# Patient Record
Sex: FEMALE | Race: WHITE | Employment: FULL TIME | URBAN - METROPOLITAN AREA
[De-identification: names, ages, dates, MRNs, and addresses within clinical notes are randomized per-mention and may not be internally consistent; named-entity substitution may affect disease eponyms.]

---

## 2020-04-22 PROBLEM — G44.229 CHRONIC TENSION-TYPE HEADACHE, NOT INTRACTABLE: Status: ACTIVE | Noted: 2019-01-23

## 2020-04-22 PROBLEM — K58.2 IRRITABLE BOWEL SYNDROME WITH BOTH CONSTIPATION AND DIARRHEA: Status: ACTIVE | Noted: 2018-11-15

## 2020-04-22 PROBLEM — Z34.90 PREGNANCY: Status: ACTIVE | Noted: 2020-04-22

## 2020-04-22 PROBLEM — I10 ESSENTIAL HYPERTENSION, BENIGN: Status: ACTIVE | Noted: 2019-01-11

## 2020-04-22 PROBLEM — O09.30 LATE PRENATAL CARE: Status: ACTIVE | Noted: 2020-04-22

## 2020-04-22 PROBLEM — F17.210 CIGARETTE SMOKER: Status: ACTIVE | Noted: 2019-01-03

## 2020-04-22 PROBLEM — I10 ESSENTIAL HYPERTENSION, BENIGN: Status: RESOLVED | Noted: 2019-01-11 | Resolved: 2020-04-22

## 2020-04-22 PROBLEM — E28.2 PCOS (POLYCYSTIC OVARIAN SYNDROME): Status: ACTIVE | Noted: 2020-04-22

## 2020-04-22 PROBLEM — C71.1: Status: ACTIVE | Noted: 2019-03-19

## 2020-04-30 PROBLEM — O34.80 POLYCYSTIC OVARY AFFECTING PREGNANCY, ANTEPARTUM: Status: ACTIVE | Noted: 2020-04-22

## 2020-04-30 PROBLEM — G44.229 CHRONIC TENSION-TYPE HEADACHE, NOT INTRACTABLE: Status: RESOLVED | Noted: 2019-01-23 | Resolved: 2020-04-30

## 2020-04-30 PROBLEM — O99.332 TOBACCO SMOKING AFFECTING PREGNANCY IN SECOND TRIMESTER: Status: ACTIVE | Noted: 2019-01-03

## 2020-04-30 PROBLEM — O09.92 HIGH-RISK PREGNANCY IN SECOND TRIMESTER: Status: ACTIVE | Noted: 2020-04-22

## 2020-05-04 PROBLEM — Z87.898 HISTORY OF BRAIN TUMOR: Status: ACTIVE | Noted: 2019-03-19

## 2020-05-04 PROBLEM — O99.613 IRRITABLE BOWEL SYNDROME AFFECTING PREGNANCY IN THIRD TRIMESTER: Status: ACTIVE | Noted: 2018-11-15

## 2020-05-04 PROBLEM — O99.332 TOBACCO SMOKING AFFECTING PREGNANCY IN SECOND TRIMESTER: Status: RESOLVED | Noted: 2019-01-03 | Resolved: 2020-05-04

## 2020-05-04 PROBLEM — O99.612 IRRITABLE BOWEL SYNDROME AFFECTING PREGNANCY IN SECOND TRIMESTER: Status: ACTIVE | Noted: 2018-11-15

## 2020-05-04 PROBLEM — K58.9 IRRITABLE BOWEL SYNDROME AFFECTING PREGNANCY IN SECOND TRIMESTER: Status: ACTIVE | Noted: 2018-11-15

## 2020-05-04 PROBLEM — C71.9 ASTROCYTOMA BRAIN TUMOR (HCC): Status: ACTIVE | Noted: 2019-03-19

## 2020-09-04 LAB — GRBS, EXTERNAL: NEGATIVE

## 2020-09-25 PROBLEM — Z23 ENCOUNTER FOR IMMUNIZATION: Status: ACTIVE | Noted: 2020-09-25

## 2020-10-02 ENCOUNTER — HOSPITAL ENCOUNTER (INPATIENT)
Age: 30
LOS: 2 days | Discharge: HOME | DRG: 540 | End: 2020-10-04
Attending: OBSTETRICS & GYNECOLOGY | Admitting: OBSTETRICS & GYNECOLOGY
Payer: MEDICAID

## 2020-10-02 ENCOUNTER — ANESTHESIA (OUTPATIENT)
Dept: LABOR AND DELIVERY | Age: 30
DRG: 540 | End: 2020-10-02
Payer: MEDICAID

## 2020-10-02 ENCOUNTER — ANESTHESIA EVENT (OUTPATIENT)
Dept: LABOR AND DELIVERY | Age: 30
DRG: 540 | End: 2020-10-02
Payer: MEDICAID

## 2020-10-02 DIAGNOSIS — O47.9 UTERINE CONTRACTIONS DURING PREGNANCY: Primary | ICD-10-CM

## 2020-10-02 LAB
ABO + RH BLD: NORMAL
ARTERIAL PATENCY WRIST A: ABNORMAL
ARTERIAL PATENCY WRIST A: ABNORMAL
BASE DEFICIT BLD-SCNC: 5 MMOL/L
BASE DEFICIT BLD-SCNC: 5 MMOL/L
BDY SITE: ABNORMAL
BDY SITE: ABNORMAL
BLOOD GROUP ANTIBODIES SERPL: NORMAL
CA-I BLD-MCNC: 1.38 MMOL/L (ref 1.12–1.32)
CA-I BLD-MCNC: 1.39 MMOL/L (ref 1.12–1.32)
COLLECT TIME,HTIME: 2036
COLLECT TIME,HTIME: 2036
ERYTHROCYTE [DISTWIDTH] IN BLOOD BY AUTOMATED COUNT: 13.5 % (ref 11.9–14.6)
GAS FLOW.O2 O2 DELIVERY SYS: ABNORMAL L/MIN
GAS FLOW.O2 O2 DELIVERY SYS: ABNORMAL L/MIN
GLUCOSE BLD STRIP.AUTO-MCNC: 90 MG/DL (ref 65–100)
GLUCOSE BLD STRIP.AUTO-MCNC: 90 MG/DL (ref 65–100)
HCO3 BLD-SCNC: 20.4 MMOL/L (ref 22–26)
HCO3 BLD-SCNC: 20.8 MMOL/L (ref 22–26)
HCT VFR BLD AUTO: 37.7 % (ref 35.8–46.3)
HGB BLD-MCNC: 13 G/DL (ref 11.7–15.4)
MCH RBC QN AUTO: 29.1 PG (ref 26.1–32.9)
MCHC RBC AUTO-ENTMCNC: 34.5 G/DL (ref 31.4–35)
MCV RBC AUTO: 84.3 FL (ref 79.6–97.8)
NRBC # BLD: 0 K/UL (ref 0–0.2)
PCO2 BLD: 38.4 MMHG (ref 35–45)
PCO2 BLD: 38.8 MMHG (ref 35–45)
PH BLD: 7.33 [PH] (ref 7.35–7.45)
PH BLD: 7.34 [PH] (ref 7.35–7.45)
PLATELET # BLD AUTO: 212 K/UL (ref 150–450)
PMV BLD AUTO: 10.2 FL (ref 9.4–12.3)
PO2 BLD: 26 MMHG (ref 75–100)
PO2 BLD: 27 MMHG (ref 75–100)
POTASSIUM BLD-SCNC: 5.3 MMOL/L (ref 3.5–5.1)
POTASSIUM BLD-SCNC: 5.8 MMOL/L (ref 3.5–5.1)
RBC # BLD AUTO: 4.47 M/UL (ref 4.05–5.2)
SAO2 % BLD: 44 % (ref 95–98)
SAO2 % BLD: 46 % (ref 95–98)
SERVICE CMNT-IMP: ABNORMAL
SERVICE CMNT-IMP: ABNORMAL
SODIUM BLD-SCNC: 137 MMOL/L (ref 136–145)
SODIUM BLD-SCNC: 139 MMOL/L (ref 136–145)
SPECIMEN EXP DATE BLD: NORMAL
SPECIMEN TYPE: ABNORMAL
SPECIMEN TYPE: ABNORMAL
WBC # BLD AUTO: 9.8 K/UL (ref 4.3–11.1)

## 2020-10-02 PROCEDURE — 2709999900 HC NON-CHARGEABLE SUPPLY: Performed by: OBSTETRICS & GYNECOLOGY

## 2020-10-02 PROCEDURE — 76060000078 HC EPIDURAL ANESTHESIA: Performed by: OBSTETRICS & GYNECOLOGY

## 2020-10-02 PROCEDURE — 74011250636 HC RX REV CODE- 250/636: Performed by: OBSTETRICS & GYNECOLOGY

## 2020-10-02 PROCEDURE — 65270000029 HC RM PRIVATE

## 2020-10-02 PROCEDURE — 77030002974 HC SUT PLN J&J -A: Performed by: OBSTETRICS & GYNECOLOGY

## 2020-10-02 PROCEDURE — 85027 COMPLETE CBC AUTOMATED: CPT

## 2020-10-02 PROCEDURE — 74011000250 HC RX REV CODE- 250: Performed by: ANESTHESIOLOGY

## 2020-10-02 PROCEDURE — 2709999900 HC NON-CHARGEABLE SUPPLY

## 2020-10-02 PROCEDURE — 75410000003 HC RECOV DEL/VAG/CSECN EA 0.5 HR: Performed by: OBSTETRICS & GYNECOLOGY

## 2020-10-02 PROCEDURE — 74011000250 HC RX REV CODE- 250: Performed by: REGISTERED NURSE

## 2020-10-02 PROCEDURE — 75410000002 HC LABOR FEE PER 1 HR

## 2020-10-02 PROCEDURE — 77030040922 HC BLNKT HYPOTHRM STRY -A: Performed by: ANESTHESIOLOGY

## 2020-10-02 PROCEDURE — 99285 EMERGENCY DEPT VISIT HI MDM: CPT

## 2020-10-02 PROCEDURE — 77030002888 HC SUT CHRMC J&J -A: Performed by: OBSTETRICS & GYNECOLOGY

## 2020-10-02 PROCEDURE — 77030032490 HC SLV COMPR SCD KNE COVD -B: Performed by: OBSTETRICS & GYNECOLOGY

## 2020-10-02 PROCEDURE — 77030019905 HC CATH URETH INTMIT MDII -A

## 2020-10-02 PROCEDURE — 77030002933 HC SUT MCRYL J&J -A: Performed by: OBSTETRICS & GYNECOLOGY

## 2020-10-02 PROCEDURE — 74011000250 HC RX REV CODE- 250: Performed by: OBSTETRICS & GYNECOLOGY

## 2020-10-02 PROCEDURE — 82947 ASSAY GLUCOSE BLOOD QUANT: CPT

## 2020-10-02 PROCEDURE — 77030034696 HC CATH URETH FOL 2W BARD -A: Performed by: OBSTETRICS & GYNECOLOGY

## 2020-10-02 PROCEDURE — 82803 BLOOD GASES ANY COMBINATION: CPT

## 2020-10-02 PROCEDURE — 74011250637 HC RX REV CODE- 250/637: Performed by: OBSTETRICS & GYNECOLOGY

## 2020-10-02 PROCEDURE — 75410000003 HC RECOV DEL/VAG/CSECN EA 0.5 HR

## 2020-10-02 PROCEDURE — 76010000392 HC C SECN EA ADDL 0.5 HR: Performed by: OBSTETRICS & GYNECOLOGY

## 2020-10-02 PROCEDURE — 74011250637 HC RX REV CODE- 250/637: Performed by: ANESTHESIOLOGY

## 2020-10-02 PROCEDURE — 76060000078 HC EPIDURAL ANESTHESIA

## 2020-10-02 PROCEDURE — 76010000391 HC C SECN FIRST 1 HR: Performed by: OBSTETRICS & GYNECOLOGY

## 2020-10-02 PROCEDURE — 77030014125 HC TY EPDRL BBMI -B: Performed by: ANESTHESIOLOGY

## 2020-10-02 PROCEDURE — 36415 COLL VENOUS BLD VENIPUNCTURE: CPT

## 2020-10-02 PROCEDURE — 86900 BLOOD TYPING SEROLOGIC ABO: CPT

## 2020-10-02 PROCEDURE — 4A1HX4Z MONITORING OF PRODUCTS OF CONCEPTION, CARDIAC ELECTRICAL ACTIVITY, EXTERNAL APPROACH: ICD-10-PCS | Performed by: OBSTETRICS & GYNECOLOGY

## 2020-10-02 PROCEDURE — 00HU33Z INSERTION OF INFUSION DEVICE INTO SPINAL CANAL, PERCUTANEOUS APPROACH: ICD-10-PCS | Performed by: ANESTHESIOLOGY

## 2020-10-02 PROCEDURE — 74011250636 HC RX REV CODE- 250/636: Performed by: REGISTERED NURSE

## 2020-10-02 PROCEDURE — 59025 FETAL NON-STRESS TEST: CPT

## 2020-10-02 PROCEDURE — A4300 CATH IMPL VASC ACCESS PORTAL: HCPCS | Performed by: ANESTHESIOLOGY

## 2020-10-02 RX ORDER — SODIUM CHLORIDE, SODIUM LACTATE, POTASSIUM CHLORIDE, CALCIUM CHLORIDE 600; 310; 30; 20 MG/100ML; MG/100ML; MG/100ML; MG/100ML
100 INJECTION, SOLUTION INTRAVENOUS CONTINUOUS
Status: DISCONTINUED | OUTPATIENT
Start: 2020-10-02 | End: 2020-10-02

## 2020-10-02 RX ORDER — ONDANSETRON 2 MG/ML
INJECTION INTRAMUSCULAR; INTRAVENOUS AS NEEDED
Status: DISCONTINUED | OUTPATIENT
Start: 2020-10-02 | End: 2020-10-02 | Stop reason: HOSPADM

## 2020-10-02 RX ORDER — KETOROLAC TROMETHAMINE 30 MG/ML
30 INJECTION, SOLUTION INTRAMUSCULAR; INTRAVENOUS
Status: DISCONTINUED | OUTPATIENT
Start: 2020-10-02 | End: 2020-10-02

## 2020-10-02 RX ORDER — ROPIVACAINE HYDROCHLORIDE 2 MG/ML
INJECTION, SOLUTION EPIDURAL; INFILTRATION; PERINEURAL
Status: DISCONTINUED | OUTPATIENT
Start: 2020-10-02 | End: 2020-10-02 | Stop reason: HOSPADM

## 2020-10-02 RX ORDER — OXYTOCIN/RINGER'S LACTATE 30/500 ML
1-25 PLASTIC BAG, INJECTION (ML) INTRAVENOUS
Status: DISCONTINUED | OUTPATIENT
Start: 2020-10-02 | End: 2020-10-02

## 2020-10-02 RX ORDER — SODIUM CHLORIDE 0.9 % (FLUSH) 0.9 %
5-40 SYRINGE (ML) INJECTION EVERY 8 HOURS
Status: DISCONTINUED | OUTPATIENT
Start: 2020-10-02 | End: 2020-10-02

## 2020-10-02 RX ORDER — IBUPROFEN 400 MG/1
400 TABLET ORAL
Status: DISCONTINUED | OUTPATIENT
Start: 2020-10-02 | End: 2020-10-02

## 2020-10-02 RX ORDER — LIDOCAINE HYDROCHLORIDE AND EPINEPHRINE 20; 5 MG/ML; UG/ML
INJECTION, SOLUTION EPIDURAL; INFILTRATION; INTRACAUDAL; PERINEURAL AS NEEDED
Status: DISCONTINUED | OUTPATIENT
Start: 2020-10-02 | End: 2020-10-02 | Stop reason: HOSPADM

## 2020-10-02 RX ORDER — MORPHINE SULFATE 10 MG/ML
5 INJECTION, SOLUTION INTRAMUSCULAR; INTRAVENOUS
Status: DISCONTINUED | OUTPATIENT
Start: 2020-10-02 | End: 2020-10-02

## 2020-10-02 RX ORDER — SIMETHICONE 80 MG
80 TABLET,CHEWABLE ORAL
Status: DISCONTINUED | OUTPATIENT
Start: 2020-10-03 | End: 2020-10-04 | Stop reason: HOSPADM

## 2020-10-02 RX ORDER — NALOXONE HYDROCHLORIDE 0.4 MG/ML
0.1 INJECTION, SOLUTION INTRAMUSCULAR; INTRAVENOUS; SUBCUTANEOUS
Status: DISCONTINUED | OUTPATIENT
Start: 2020-10-02 | End: 2020-10-03

## 2020-10-02 RX ORDER — OXYTOCIN/RINGER'S LACTATE 30/500 ML
PLASTIC BAG, INJECTION (ML) INTRAVENOUS
Status: DISCONTINUED | OUTPATIENT
Start: 2020-10-02 | End: 2020-10-02 | Stop reason: HOSPADM

## 2020-10-02 RX ORDER — SODIUM CHLORIDE 0.9 % (FLUSH) 0.9 %
5-40 SYRINGE (ML) INJECTION AS NEEDED
Status: DISCONTINUED | OUTPATIENT
Start: 2020-10-02 | End: 2020-10-03

## 2020-10-02 RX ORDER — KETOROLAC TROMETHAMINE 30 MG/ML
INJECTION, SOLUTION INTRAMUSCULAR; INTRAVENOUS AS NEEDED
Status: DISCONTINUED | OUTPATIENT
Start: 2020-10-02 | End: 2020-10-02 | Stop reason: HOSPADM

## 2020-10-02 RX ORDER — CEFAZOLIN SODIUM/WATER 2 G/20 ML
2 SYRINGE (ML) INTRAVENOUS
Status: COMPLETED | OUTPATIENT
Start: 2020-10-02 | End: 2020-10-02

## 2020-10-02 RX ORDER — SODIUM CHLORIDE 0.9 % (FLUSH) 0.9 %
5-40 SYRINGE (ML) INJECTION AS NEEDED
Status: DISCONTINUED | OUTPATIENT
Start: 2020-10-02 | End: 2020-10-02

## 2020-10-02 RX ORDER — SODIUM CHLORIDE, SODIUM LACTATE, POTASSIUM CHLORIDE, CALCIUM CHLORIDE 600; 310; 30; 20 MG/100ML; MG/100ML; MG/100ML; MG/100ML
75 INJECTION, SOLUTION INTRAVENOUS CONTINUOUS
Status: DISCONTINUED | OUTPATIENT
Start: 2020-10-02 | End: 2020-10-02

## 2020-10-02 RX ORDER — DEXTROSE, SODIUM CHLORIDE, SODIUM LACTATE, POTASSIUM CHLORIDE, AND CALCIUM CHLORIDE 5; .6; .31; .03; .02 G/100ML; G/100ML; G/100ML; G/100ML; G/100ML
125 INJECTION, SOLUTION INTRAVENOUS CONTINUOUS
Status: DISCONTINUED | OUTPATIENT
Start: 2020-10-02 | End: 2020-10-02

## 2020-10-02 RX ORDER — DIPHENHYDRAMINE HYDROCHLORIDE 50 MG/ML
12.5 INJECTION, SOLUTION INTRAMUSCULAR; INTRAVENOUS
Status: DISCONTINUED | OUTPATIENT
Start: 2020-10-02 | End: 2020-10-02 | Stop reason: SDUPTHER

## 2020-10-02 RX ORDER — OXYCODONE AND ACETAMINOPHEN 7.5; 325 MG/1; MG/1
2 TABLET ORAL
Status: DISCONTINUED | OUTPATIENT
Start: 2020-10-02 | End: 2020-10-02

## 2020-10-02 RX ORDER — LIDOCAINE HYDROCHLORIDE AND EPINEPHRINE 15; 5 MG/ML; UG/ML
INJECTION, SOLUTION EPIDURAL
Status: COMPLETED | OUTPATIENT
Start: 2020-10-02 | End: 2020-10-02

## 2020-10-02 RX ORDER — SODIUM CHLORIDE, SODIUM LACTATE, POTASSIUM CHLORIDE, CALCIUM CHLORIDE 600; 310; 30; 20 MG/100ML; MG/100ML; MG/100ML; MG/100ML
INJECTION, SOLUTION INTRAVENOUS
Status: DISCONTINUED | OUTPATIENT
Start: 2020-10-02 | End: 2020-10-02 | Stop reason: HOSPADM

## 2020-10-02 RX ORDER — KETOROLAC TROMETHAMINE 30 MG/ML
30 INJECTION, SOLUTION INTRAMUSCULAR; INTRAVENOUS
Status: DISCONTINUED | OUTPATIENT
Start: 2020-10-02 | End: 2020-10-03

## 2020-10-02 RX ORDER — LIDOCAINE HYDROCHLORIDE 10 MG/ML
1 INJECTION INFILTRATION; PERINEURAL
Status: DISCONTINUED | OUTPATIENT
Start: 2020-10-02 | End: 2020-10-02

## 2020-10-02 RX ORDER — SODIUM CHLORIDE, SODIUM LACTATE, POTASSIUM CHLORIDE, CALCIUM CHLORIDE 600; 310; 30; 20 MG/100ML; MG/100ML; MG/100ML; MG/100ML
75 INJECTION, SOLUTION INTRAVENOUS CONTINUOUS
Status: DISCONTINUED | OUTPATIENT
Start: 2020-10-02 | End: 2020-10-03

## 2020-10-02 RX ORDER — ONDANSETRON 2 MG/ML
4 INJECTION INTRAMUSCULAR; INTRAVENOUS AS NEEDED
Status: COMPLETED | OUTPATIENT
Start: 2020-10-02 | End: 2020-10-03

## 2020-10-02 RX ORDER — MORPHINE SULFATE 0.5 MG/ML
INJECTION, SOLUTION EPIDURAL; INTRATHECAL; INTRAVENOUS AS NEEDED
Status: DISCONTINUED | OUTPATIENT
Start: 2020-10-02 | End: 2020-10-02 | Stop reason: HOSPADM

## 2020-10-02 RX ORDER — NALOXONE HYDROCHLORIDE 0.4 MG/ML
0.1 INJECTION, SOLUTION INTRAMUSCULAR; INTRAVENOUS; SUBCUTANEOUS
Status: DISCONTINUED | OUTPATIENT
Start: 2020-10-02 | End: 2020-10-02

## 2020-10-02 RX ORDER — OXYCODONE AND ACETAMINOPHEN 5; 325 MG/1; MG/1
1 TABLET ORAL
Status: DISCONTINUED | OUTPATIENT
Start: 2020-10-02 | End: 2020-10-02

## 2020-10-02 RX ORDER — MINERAL OIL
120 OIL (ML) ORAL
Status: DISCONTINUED | OUTPATIENT
Start: 2020-10-02 | End: 2020-10-02

## 2020-10-02 RX ORDER — ZOLPIDEM TARTRATE 5 MG/1
5 TABLET ORAL
Status: DISCONTINUED | OUTPATIENT
Start: 2020-10-02 | End: 2020-10-04 | Stop reason: HOSPADM

## 2020-10-02 RX ORDER — OXYTOCIN/RINGER'S LACTATE 30/500 ML
95 PLASTIC BAG, INJECTION (ML) INTRAVENOUS ONCE
Status: DISCONTINUED | OUTPATIENT
Start: 2020-10-02 | End: 2020-10-02

## 2020-10-02 RX ORDER — OXYTOCIN/RINGER'S LACTATE 30/500 ML
10 PLASTIC BAG, INJECTION (ML) INTRAVENOUS ONCE
Status: DISCONTINUED | OUTPATIENT
Start: 2020-10-02 | End: 2020-10-02

## 2020-10-02 RX ORDER — MORPHINE SULFATE 10 MG/ML
5 INJECTION, SOLUTION INTRAMUSCULAR; INTRAVENOUS
Status: DISCONTINUED | OUTPATIENT
Start: 2020-10-02 | End: 2020-10-03

## 2020-10-02 RX ORDER — ONDANSETRON 2 MG/ML
4 INJECTION INTRAMUSCULAR; INTRAVENOUS ONCE
Status: COMPLETED | OUTPATIENT
Start: 2020-10-02 | End: 2020-10-02

## 2020-10-02 RX ORDER — TRISODIUM CITRATE DIHYDRATE AND CITRIC ACID MONOHYDRATE 500; 334 MG/5ML; MG/5ML
30 SOLUTION ORAL
Status: COMPLETED | OUTPATIENT
Start: 2020-10-02 | End: 2020-10-02

## 2020-10-02 RX ORDER — BUTORPHANOL TARTRATE 2 MG/ML
1 INJECTION INTRAMUSCULAR; INTRAVENOUS
Status: DISCONTINUED | OUTPATIENT
Start: 2020-10-02 | End: 2020-10-02

## 2020-10-02 RX ORDER — LIDOCAINE HYDROCHLORIDE 20 MG/ML
JELLY TOPICAL
Status: DISCONTINUED | OUTPATIENT
Start: 2020-10-02 | End: 2020-10-02

## 2020-10-02 RX ORDER — TRISODIUM CITRATE DIHYDRATE AND CITRIC ACID MONOHYDRATE 500; 334 MG/5ML; MG/5ML
SOLUTION ORAL
Status: DISCONTINUED
Start: 2020-10-02 | End: 2020-10-02

## 2020-10-02 RX ORDER — OXYCODONE HYDROCHLORIDE 5 MG/1
10 TABLET ORAL
Status: DISCONTINUED | OUTPATIENT
Start: 2020-10-02 | End: 2020-10-03

## 2020-10-02 RX ORDER — ROPIVACAINE HYDROCHLORIDE 2 MG/ML
INJECTION, SOLUTION EPIDURAL; INFILTRATION; PERINEURAL AS NEEDED
Status: DISCONTINUED | OUTPATIENT
Start: 2020-10-02 | End: 2020-10-02 | Stop reason: HOSPADM

## 2020-10-02 RX ORDER — ONDANSETRON 2 MG/ML
4 INJECTION INTRAMUSCULAR; INTRAVENOUS AS NEEDED
Status: DISCONTINUED | OUTPATIENT
Start: 2020-10-02 | End: 2020-10-02

## 2020-10-02 RX ORDER — OXYCODONE HYDROCHLORIDE 5 MG/1
10 TABLET ORAL
Status: DISCONTINUED | OUTPATIENT
Start: 2020-10-02 | End: 2020-10-02

## 2020-10-02 RX ORDER — DIPHENHYDRAMINE HYDROCHLORIDE 50 MG/ML
12.5 INJECTION, SOLUTION INTRAMUSCULAR; INTRAVENOUS
Status: DISCONTINUED | OUTPATIENT
Start: 2020-10-02 | End: 2020-10-03

## 2020-10-02 RX ADMIN — MORPHINE SULFATE 1 MG: 0.5 INJECTION, SOLUTION EPIDURAL; INTRATHECAL; INTRAVENOUS at 21:09

## 2020-10-02 RX ADMIN — Medication 10 ML/HR: at 11:53

## 2020-10-02 RX ADMIN — CEFAZOLIN SODIUM 2 G: 100 INJECTION, POWDER, LYOPHILIZED, FOR SOLUTION INTRAVENOUS at 19:57

## 2020-10-02 RX ADMIN — ONDANSETRON 4 MG: 2 INJECTION INTRAMUSCULAR; INTRAVENOUS at 17:22

## 2020-10-02 RX ADMIN — AZITHROMYCIN 500 MG: 500 INJECTION, POWDER, LYOPHILIZED, FOR SOLUTION INTRAVENOUS at 19:57

## 2020-10-02 RX ADMIN — LIDOCAINE HYDROCHLORIDE,EPINEPHRINE BITARTRATE 5 ML: 20; .005 INJECTION, SOLUTION EPIDURAL; INFILTRATION; INTRACAUDAL; PERINEURAL at 20:17

## 2020-10-02 RX ADMIN — Medication 2 MILLI-UNITS/MIN: at 12:50

## 2020-10-02 RX ADMIN — MORPHINE SULFATE 2 MG: 0.5 INJECTION, SOLUTION EPIDURAL; INTRATHECAL; INTRAVENOUS at 21:04

## 2020-10-02 RX ADMIN — SODIUM CITRATE AND CITRIC ACID MONOHYDRATE 30 ML: 500; 334 SOLUTION ORAL at 19:56

## 2020-10-02 RX ADMIN — IBUPROFEN 400 MG: 400 TABLET ORAL at 22:34

## 2020-10-02 RX ADMIN — SODIUM CHLORIDE, SODIUM LACTATE, POTASSIUM CHLORIDE, AND CALCIUM CHLORIDE: 600; 310; 30; 20 INJECTION, SOLUTION INTRAVENOUS at 20:56

## 2020-10-02 RX ADMIN — KETOROLAC TROMETHAMINE 30 MG: 30 INJECTION, SOLUTION INTRAMUSCULAR; INTRAVENOUS at 21:10

## 2020-10-02 RX ADMIN — Medication 10 ML: at 11:53

## 2020-10-02 RX ADMIN — SODIUM CHLORIDE, SODIUM LACTATE, POTASSIUM CHLORIDE, CALCIUM CHLORIDE, AND DEXTROSE MONOHYDRATE 125 ML/HR: 600; 310; 30; 20; 5 INJECTION, SOLUTION INTRAVENOUS at 11:24

## 2020-10-02 RX ADMIN — SODIUM CHLORIDE, SODIUM LACTATE, POTASSIUM CHLORIDE, CALCIUM CHLORIDE, AND DEXTROSE MONOHYDRATE 125 ML/HR: 600; 310; 30; 20; 5 INJECTION, SOLUTION INTRAVENOUS at 18:37

## 2020-10-02 RX ADMIN — ONDANSETRON 4 MG: 2 INJECTION INTRAMUSCULAR; INTRAVENOUS at 20:40

## 2020-10-02 RX ADMIN — Medication 500 ML/HR: at 20:37

## 2020-10-02 RX ADMIN — MORPHINE SULFATE 2 MG: 0.5 INJECTION, SOLUTION EPIDURAL; INTRATHECAL; INTRAVENOUS at 20:57

## 2020-10-02 RX ADMIN — SODIUM CHLORIDE, SODIUM LACTATE, POTASSIUM CHLORIDE, AND CALCIUM CHLORIDE: 600; 310; 30; 20 INJECTION, SOLUTION INTRAVENOUS at 20:18

## 2020-10-02 RX ADMIN — LIDOCAINE HYDROCHLORIDE,EPINEPHRINE BITARTRATE 5 ML: 20; .005 INJECTION, SOLUTION EPIDURAL; INFILTRATION; INTRACAUDAL; PERINEURAL at 20:13

## 2020-10-02 RX ADMIN — PHENYLEPHRINE HYDROCHLORIDE 160 MCG: 10 INJECTION INTRAVENOUS at 20:57

## 2020-10-02 RX ADMIN — LIDOCAINE HYDROCHLORIDE,EPINEPHRINE BITARTRATE 5 ML: 15; .005 INJECTION, SOLUTION EPIDURAL; INFILTRATION; INTRACAUDAL; PERINEURAL at 11:48

## 2020-10-02 NOTE — PROGRESS NOTES
Labor check   Now with ROSALVA  AROM clear  3-4/75/-3  TOCO every 1-3  PT reports Dr Ty Amador felt pushing would be ok for her

## 2020-10-02 NOTE — ANESTHESIA PREPROCEDURE EVALUATION
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 Cardiovascular                  Exercise tolerance: >4 METS     GI/Hepatic/Renal  Within defined limits              Endo/Other        Obesity     Other Findings              Physical Exam    Airway  Mallampati: II  TM Distance: > 6 cm  Neck ROM: normal range of motion   Mouth opening: Normal     Cardiovascular    Rhythm: regular           Dental  No notable dental hx       Pulmonary                 Abdominal  GI exam deferred       Other Findings            Anesthetic Plan    ASA: 3  Anesthesia type: epidural            Anesthetic plan and risks discussed with: Patient      Failure to progress--excellent working ANDRES. Plan aspiration prophylaxis with Bicitra.  Plan dosing ANDRES with appropriate surgical concentration of a local anesthetic

## 2020-10-02 NOTE — PROGRESS NOTES
1538-I&O cath for 350 cc urine. SVE 8/100/0. Pt repositioned to left side on peanut ball. 1630-  Pt repositioned to throne position.

## 2020-10-02 NOTE — PROGRESS NOTES
10/02/20 0950   Cervical Exam   Dilation (cm) 3   Eff 70 %   Station -1   Vaginal exam done byLexus BARTON in one hour

## 2020-10-02 NOTE — PROGRESS NOTES
10/02/20 1058   Cervical Exam   Dilation (cm) 4  (4-4.5)   Eff 90 %   Station -1   Vaginal exam done byLexus Mendez   Orders to admit for labor.

## 2020-10-02 NOTE — PROGRESS NOTES
Exam 7-8/c/0  TOCO every 1  FHT   Patient Vitals for the past 4 hrs:    Mode Fetal Heart Rate Variability Decelerations Accelerations RN Reviewed Strip?   10/02/20 1828 External 145 (!) Less than or equal to 5 BPM None No Yes   10/02/20 1814 External 145 6-25 BPM None Yes Yes   10/02/20 1758 External 140 6-25 BPM None Yes Yes   10/02/20 1745 External 145 (!) Less than or equal to 5 BPM None Yes Yes   10/02/20 1728 External 145 6-25 BPM None No Yes   10/02/20 1714 External 145 6-25 BPM None Yes Yes   10/02/20 1659 External 145 6-25 BPM None Yes Yes   10/02/20 1643 External 145 6-25 BPM None Yes Yes   10/02/20 1629 External 140 6-25 BPM None Yes Yes   10/02/20 1614 External 140 6-25 BPM None Yes Yes   10/02/20 1558 External 140 6-25 BPM None Yes Yes   10/02/20 1544 External 140 6-25 BPM None Yes Yes   reassuring status for both mom and fetus however no cervical change in 4 hours  This is concerning of arrest of dilation likely due to CPD  Offered pt csec for above concerns offered to continue to labor low likelihood of change given last 4 hours  Pt wishes to think about it and call her MOM

## 2020-10-02 NOTE — PROGRESS NOTES
Adin Patterson at bedside at [phone]. JOSUE Jansen. at bedside at 1141    Assisted pt to sitting up on bedside at 1137. Timeout completed at [phone] with MD, JOSUE and myself at bedside. Test dose given at 1148. Negative reaction. Dose given at 1152. Pt assisted to lying back in left tilt position. See anesthesia record for details. See vital sign flow sheet for BP. Tolerated procedure well.

## 2020-10-02 NOTE — PROGRESS NOTES
I&O cath for 150cc urine. SVE Rim/100/0. Molding noted. FHR  Cat 1.     1750-Pt repositioned side-lying and release on left side. 1755- Repositioned to side-lying and release on right side. 1800- Dr Lianna Lanza given update. MD will come assess in one hour.

## 2020-10-02 NOTE — ANESTHESIA PROCEDURE NOTES
Epidural Block    Start time: 10/2/2020 11:43 AM  End time: 10/2/2020 11:48 AM  Performed by: Tegan Gilbert MD  Authorized by: Tegan Gilbert MD     Pre-Procedure  Indication: labor epidural    Preanesthetic Checklist: patient identified, risks and benefits discussed, anesthesia consent, site marked, patient being monitored, timeout performed and anesthesia consent    Timeout Time: 11:43        Epidural:   Patient position:  Seated  Prep region:  Lumbar  Prep: Patient draped and Chlorhexidine    Location:  L4-5    Needle and Epidural Catheter:   Needle Type:  Tuohy  Needle Gauge:  17 G  Injection Technique:  Loss of resistance using air  Attempts:  2  Depth in Epidural Space (cm):  7  Events: no blood with aspiration    Test Dose:  Negative    Assessment:   Catheter Secured:  Tegaderm and tape  Insertion:  Uncomplicated  Patient tolerance:  Patient tolerated the procedure well with no immediate complications

## 2020-10-02 NOTE — PROGRESS NOTES
0840-Pt to room [facility] for triage with chief complaint of contractions. Assessment begins, EFM and [place] applied to a soft non tender abdomen and tracing well.      0854-Dr Stevens Lias notified of pt arrival.

## 2020-10-02 NOTE — H&P
CC  Chief Complaint   Patient presents with    Contractions     40w5d       History:    31 y.o. female  at 43w11d weeks gestation with a   Chief Complaint   Patient presents with    Contractions     40w5d     who requests evaluation for possible labor. She is scheduled for induction on Monday, but was originally planned for yesterday or today. Was 2 cm in office.    Her pregnancy issues include: hx of astrocytoma    Fetal movement has been normal    HISTORY:    Social History     Substance and Sexual Activity   Sexual Activity Yes    Partners: Female    Birth control/protection: None       OB History    Para Term  AB Living   1 0 0 0 0 0   SAB TAB Ectopic Molar Multiple Live Births   0 0 0 0 0 0      # Outcome Date GA Lbr Ja/2nd Weight Sex Delivery Anes PTL Lv   1 Current                Social History     Socioeconomic History    Marital status:      Spouse name: Not on file    Number of children: Not on file    Years of education: Not on file    Highest education level: Not on file   Occupational History    Not on file   Social Needs    Financial resource strain: Not on file    Food insecurity     Worry: Not on file     Inability: Not on file   [employer] needs     Medical: Not on file     Non-medical: Not on file   Tobacco Use    Smoking status: Former Smoker    Smokeless tobacco: Never Used    Tobacco comment: quit with +UPT   Substance and Sexual Activity    Alcohol use: Not Currently    Drug use: Not Currently     Types: Marijuana     Comment: in high school    Sexual activity: Yes     Partners: Female     Birth control/protection: None   Lifestyle    Physical activity     Days per week: Not on file     Minutes per session: Not on file    Stress: Not on file   Relationships    Social connections     Talks on phone: Not on file     Gets together: Not on file     Attends Rastafari service: Not on file     Active member of club or organization: Not on file Attends meetings of clubs or organizations: Not on file     Relationship status: Not on file    Intimate partner violence     Fear of current or ex partner: Not on file     Emotionally abused: Not on file     Physically abused: Not on file     Forced sexual activity: Not on file   Other Topics Concern     Service Not Asked    Blood Transfusions Not Asked    Caffeine Concern Not Asked    Occupational Exposure Not Asked    Hobby Hazards Not Asked    Sleep Concern Not Asked    Stress Concern Not Asked    Weight Concern Not Asked    Special Diet Not Asked    Back Care Not Asked    Exercise Not Asked    Bike Helmet Not Asked   [address] Not Asked    Self-Exams Not Asked   Social History Narrative    Not on file       Past Surgical History:   Procedure Laterality Date    HX OTHER SURGICAL      brain tumor debulking surgery 3/2019       Past Medical History:   Diagnosis Date    Brain tumor, astrocytoma ([address]     had dubulking surgery and chemo, followed by oncology @[facility]    Chlamydia     2013 tx'd    Chronic tension-type headache, not intractable 1/23/2019    IBS (irritable bowel syndrome)          ROS:  Negative:  Negative 10 point ROS other than noted in hpi       PHYSICAL EXAM:  Blood pressure 127/71, temperature 98.1 °F (36.7 °C), not currently breastfeeding. General: well developed and well nourished  Resp:  breath sounds clear and equal bilaterally  Card:  RRR, no MRG  Abd: WNL. Uterine contractions: regular, every 2-3 minutes    Fetal Assessment: Baseline FHR: 140 per minute     Fetal heart variability: moderate     Fetal Heart Rate decelerations: none     Fetal Heart Rate accelerations: yes     Prestentation: vertex by exam,    Pelvic:   External- normal EGBSU w/o lesions     SVE- Cervical Exam: 3 cm dilated    70% effaced    -1 station       Ext: edema, clonus and DTR's normal     I have personally reviewed the patient's history, prenatal record, and pertinent test results. vital sign trends, laboratory results, previous provider notes support my clinical impression. Assessment:  40w5d weeks gestation  Reassuring fetal status  Probable early labor, postterm. Plan:  Recheck in 1 hour. If making change admit for labor.    Mohinder Callahan MD    Signed By:  Mohinder Callahan MD     October 2, 2020

## 2020-10-03 LAB
BASOPHILS # BLD: 0 K/UL (ref 0–0.2)
BASOPHILS NFR BLD: 0 % (ref 0–2)
DIFFERENTIAL METHOD BLD: ABNORMAL
EOSINOPHIL # BLD: 0 K/UL (ref 0–0.8)
EOSINOPHIL NFR BLD: 0 % (ref 0.5–7.8)
ERYTHROCYTE [DISTWIDTH] IN BLOOD BY AUTOMATED COUNT: 13.7 % (ref 11.9–14.6)
HCT VFR BLD AUTO: 29.8 % (ref 35.8–46.3)
HGB BLD-MCNC: 10.1 G/DL (ref 11.7–15.4)
IMM GRANULOCYTES # BLD AUTO: 0.1 K/UL (ref 0–0.5)
IMM GRANULOCYTES NFR BLD AUTO: 1 % (ref 0–5)
LYMPHOCYTES # BLD: 1.1 K/UL (ref 0.5–4.6)
LYMPHOCYTES NFR BLD: 9 % (ref 13–44)
MCH RBC QN AUTO: 28.9 PG (ref 26.1–32.9)
MCHC RBC AUTO-ENTMCNC: 33.9 G/DL (ref 31.4–35)
MCV RBC AUTO: 85.1 FL (ref 79.6–97.8)
MONOCYTES # BLD: 0.6 K/UL (ref 0.1–1.3)
MONOCYTES NFR BLD: 5 % (ref 4–12)
NEUTS SEG # BLD: 10.4 K/UL (ref 1.7–8.2)
NEUTS SEG NFR BLD: 85 % (ref 43–78)
NRBC # BLD: 0 K/UL (ref 0–0.2)
PLATELET # BLD AUTO: 176 K/UL (ref 150–450)
PMV BLD AUTO: 10.1 FL (ref 9.4–12.3)
RBC # BLD AUTO: 3.5 M/UL (ref 4.05–5.2)
WBC # BLD AUTO: 12.2 K/UL (ref 4.3–11.1)

## 2020-10-03 PROCEDURE — 85025 COMPLETE CBC W/AUTO DIFF WBC: CPT

## 2020-10-03 PROCEDURE — 36415 COLL VENOUS BLD VENIPUNCTURE: CPT

## 2020-10-03 PROCEDURE — 74011250637 HC RX REV CODE- 250/637: Performed by: OBSTETRICS & GYNECOLOGY

## 2020-10-03 PROCEDURE — 74011250636 HC RX REV CODE- 250/636: Performed by: ANESTHESIOLOGY

## 2020-10-03 PROCEDURE — 65270000029 HC RM PRIVATE

## 2020-10-03 RX ORDER — IBUPROFEN 800 MG/1
800 TABLET ORAL
Status: DISCONTINUED | OUTPATIENT
Start: 2020-10-03 | End: 2020-10-04 | Stop reason: HOSPADM

## 2020-10-03 RX ORDER — IBUPROFEN 400 MG/1
400 TABLET ORAL
Status: DISCONTINUED | OUTPATIENT
Start: 2020-10-03 | End: 2020-10-03

## 2020-10-03 RX ORDER — OXYCODONE AND ACETAMINOPHEN 5; 325 MG/1; MG/1
1 TABLET ORAL
Status: DISCONTINUED | OUTPATIENT
Start: 2020-10-03 | End: 2020-10-04 | Stop reason: HOSPADM

## 2020-10-03 RX ORDER — ACETAMINOPHEN 500 MG
1000 TABLET ORAL
Status: DISCONTINUED | OUTPATIENT
Start: 2020-10-03 | End: 2020-10-04 | Stop reason: HOSPADM

## 2020-10-03 RX ORDER — OXYCODONE AND ACETAMINOPHEN 7.5; 325 MG/1; MG/1
2 TABLET ORAL
Status: DISCONTINUED | OUTPATIENT
Start: 2020-10-03 | End: 2020-10-04 | Stop reason: HOSPADM

## 2020-10-03 RX ADMIN — IBUPROFEN 800 MG: 800 TABLET, FILM COATED ORAL at 20:49

## 2020-10-03 RX ADMIN — KETOROLAC TROMETHAMINE 30 MG: 30 INJECTION, SOLUTION INTRAMUSCULAR at 11:49

## 2020-10-03 RX ADMIN — SIMETHICONE CHEW TAB 80 MG 80 MG: 80 TABLET ORAL at 07:30

## 2020-10-03 RX ADMIN — KETOROLAC TROMETHAMINE 30 MG: 30 INJECTION, SOLUTION INTRAMUSCULAR at 04:27

## 2020-10-03 RX ADMIN — ONDANSETRON 4 MG: 2 INJECTION INTRAMUSCULAR; INTRAVENOUS at 05:21

## 2020-10-03 NOTE — PROGRESS NOTES
Post-Operative Day Number 1 Progress Note    Patient doing well post-op day 1 from  delivery without significant complaints. Pain controlled on current medication. Voiding without difficulty, normal lochia. Vitals:    Patient Vitals for the past 8 hrs:   BP Temp Pulse Resp SpO2   10/03/20 1100 106/67 98.6 °F (37 °C) 98 16 98 %   10/03/20 0715 115/74 98.9 °F (37.2 °C) 98 16 100 %   10/03/20 0503 137/63 99 °F (37.2 °C) 90 18 97 %     Temp (24hrs), Av.6 °F (37 °C), Min:98.1 °F (36.7 °C), Max:99 °F (37.2 °C)      Vital signs stable, afebrile. Exam:  Patient without distress. Abdomen soft, fundus firm at level of umbilicus, non tender. Incision dry and clean without erythema. Lower extremities are negative for swelling, cords or tenderness. Lab/Data Review: All lab results for the last 24 hours reviewed. Assessment and Plan:  Patient appears to be having uncomplicated post- course. Continue routine post-op care and maternal education.

## 2020-10-03 NOTE — PROGRESS NOTES
SBAR OUT Report: Mother    Verbal report given to CESAR Bishop RN (full name & credentials) on this patient, who is now being transferred to MIU (unit) for routine progression of care. The patient is wearing a green \"Anesthesia-Duramorph\" band. Report consisted of patient's Situation, Background, Assessment and Recommendations (SBAR). [place] ID bands were compared with the identification form, and verified with the patient and receiving nurse. Information from the SBAR, OR Summary, Procedure Summary, Intake/Output and MAR and the [place] Report was reviewed with the receiving nurse; opportunity for questions and clarification provided.

## 2020-10-03 NOTE — PROGRESS NOTES
Peripheral IV capped. Petty catheter removed. Patient up to bathroom with assistance. Patient unable to void at this time. Jeanette care instructed. Patient verbalizes understanding. Gown and lines changed. Patient assisted back to bed. Denies needs, instructed to call prn.

## 2020-10-03 NOTE — ANESTHESIA POSTPROCEDURE EVALUATION
Procedure(s):   SECTION. epidural    Anesthesia Post Evaluation      Multimodal analgesia: multimodal analgesia used between 6 hours prior to anesthesia start to PACU discharge  Patient location during evaluation: bedside  Patient participation: complete - patient participated  Level of consciousness: awake  Pain management: adequate  Airway patency: patent  Anesthetic complications: no  Cardiovascular status: acceptable  Respiratory status: acceptable  Hydration status: acceptable  Post anesthesia nausea and vomiting:  none  Final Post Anesthesia Temperature Assessment:  Normothermia (36.0-37.5 degrees C)      INITIAL Post-op Vital signs: No vitals data found for the desired time range.

## 2020-10-03 NOTE — LACTATION NOTE
This note was copied from a baby's chart. RN attempted to help infant breast feed. Infant will latch on but come off after a minute. Mom has very flat and sensitive nipples. On admission she stated \" she wanted to breast and bottle feed\". At this time, Mom request a bottle. RN educated Mom on nipple confusion and she verbalized understanding.

## 2020-10-03 NOTE — PROGRESS NOTES
SBAR IN Report: Mother    Verbal report received from Karla Herring RN on this patient, who is now being transferred from L&D for routine progression of care. The patient is wearing a green \"Anesthesia-Duramorph\" band. Report consisted of patient's Situation, Background, Assessment and Recommendations (SBAR). [place] ID bands were compared with the identification form, and verified with the patient and transferring nurse. Information from the SBAR and the [place] Report was reviewed with the transferring nurse; opportunity for questions and clarification provided.

## 2020-10-03 NOTE — PROGRESS NOTES
Anesthesiology  Post-op Note    Post-op day 1 s/p  via epidural with neuraxial opioids for post-op pain management. Visit Vitals  /63 (BP 1 Location: Left arm, BP Patient Position: At rest)   Pulse 90   Temp 37.2 °C (99 °F)   Resp 18   LMP  (LMP Unknown)   SpO2 97%   Breastfeeding Unknown     Airway patent, patient appropriately hydrated and appears euvolemic. Patient is Alert and oriented. Pain is well controlled. Pruritus is well controlled. Nausea is well controlled. No complaints about back or site of injection. Motor and sensory function has returned to baseline in lower extremities. Patient is satisfied with anesthetic and reports no complications. Continue current orders, then initiate surgeon's orders for pain management 24 hours after . Follow up per surgeon.

## 2020-10-04 VITALS
HEART RATE: 69 BPM | DIASTOLIC BLOOD PRESSURE: 59 MMHG | OXYGEN SATURATION: 98 % | SYSTOLIC BLOOD PRESSURE: 103 MMHG | TEMPERATURE: 98.2 F | RESPIRATION RATE: 18 BRPM

## 2020-10-04 PROCEDURE — 74011250637 HC RX REV CODE- 250/637: Performed by: OBSTETRICS & GYNECOLOGY

## 2020-10-04 PROCEDURE — 2709999900 HC NON-CHARGEABLE SUPPLY

## 2020-10-04 RX ORDER — OXYCODONE AND ACETAMINOPHEN 5; 325 MG/1; MG/1
1 TABLET ORAL
Qty: 10 TAB | Refills: 0 | Status: SHIPPED | OUTPATIENT
Start: 2020-10-04 | End: 2020-10-07

## 2020-10-04 RX ORDER — IBUPROFEN 800 MG/1
800 TABLET ORAL
Qty: 30 TAB | Refills: 0 | Status: SHIPPED | OUTPATIENT
Start: 2020-10-04 | End: 2020-11-04

## 2020-10-04 RX ADMIN — IBUPROFEN 800 MG: 800 TABLET, FILM COATED ORAL at 06:06

## 2020-10-04 RX ADMIN — IBUPROFEN 800 MG: 800 TABLET, FILM COATED ORAL at 12:17

## 2020-10-04 RX ADMIN — SIMETHICONE CHEW TAB 80 MG 80 MG: 80 TABLET ORAL at 01:08

## 2020-10-04 RX ADMIN — ACETAMINOPHEN 1000 MG: 500 TABLET, FILM COATED ORAL at 01:08

## 2020-10-04 RX ADMIN — SIMETHICONE CHEW TAB 80 MG 80 MG: 80 TABLET ORAL at 12:17

## 2020-10-04 NOTE — DISCHARGE INSTRUCTIONS
Patient Education         Section: What to Expect at Home  Your Recovery     A  section, or , is surgery to deliver your baby through a cut that the doctor makes in your lower belly and uterus. The cut is called an incision. You may have some pain in your lower belly and need pain medicine for 1 to 2 weeks. You can expect some vaginal bleeding for several weeks. You will probably need about 6 weeks to fully recover. It's important to take it easy while the incision heals. Avoid heavy lifting, strenuous activities, and exercises that strain the belly muscles while you recover. Ask a family member or friend for help with housework, cooking, and shopping. This care sheet gives you a general idea about how long it will take for you to recover. But each person recovers at a different pace. Follow the steps below to get better as quickly as possible. How can you care for yourself at home? Activity    · Rest when you feel tired. Getting enough sleep will help you recover.     · Try to walk each day. Start by walking a little more than you did the day before. Bit by bit, increase the amount you walk. Walking boosts blood flow and helps prevent pneumonia, constipation, and blood clots.     · Avoid strenuous activities, such as bicycle riding, jogging, weightlifting, and aerobic exercise, for 6 weeks or until your doctor says it is okay.     · Until your doctor says it is okay, do not lift anything heavier than your baby.     · Do not do sit-ups or other exercises that strain the belly muscles for 6 weeks or until your doctor says it is okay.     · Hold a pillow over your incision when you cough or take deep breaths. This will support your belly and decrease your pain.     · You may shower as usual. Pat the incision dry when you are done.     · You will have some vaginal bleeding. Wear sanitary pads.  Do not douche or use tampons until your doctor says it is okay.     · Ask your doctor when you can drive again.     · You will probably need to take at least 6 weeks off work. It depends on the type of work you do and how you feel.     · Ask your doctor when it is okay for you to have sex. Diet    · You can eat your normal diet. If your stomach is upset, try bland, low-fat foods like plain rice, broiled chicken, toast, and yogurt.     · Drink plenty of fluids (unless your doctor tells you not to).     · You may notice that your bowel movements are not regular right after your surgery. This is common. Try to avoid constipation and straining with bowel movements. You may want to take a fiber supplement every day. If you have not had a bowel movement after a couple of days, ask your doctor about taking a mild laxative.     · If you are breastfeeding, limit alcohol. Alcohol can cause a lack of energy and other health problems for the baby when a breastfeeding woman drinks heavily. It can also get in the way of a mom's ability to feed her baby or to care for the child in other ways. There isn't a lot of research about exactly how much alcohol can harm a baby. Having no alcohol is the safest choice for your baby. If you choose to have a drink now and then, have only one drink, and limit the number of occasions that you have a drink. Wait to breastfeed at least 2 hours after you have a drink to reduce the amount of alcohol the baby may get in the milk. Medicines    · Your doctor will tell you if and when you can restart your medicines. He or she will also give you instructions about taking any new medicines.     · If you take aspirin or some other blood thinner, ask your doctor if and when to start taking it again. Make sure that you understand exactly what your doctor wants you to do.     · Take pain medicines exactly as directed. ? If the doctor gave you a prescription medicine for pain, take it as prescribed.   ? If you are not taking a prescription pain medicine, ask your doctor if you can take an over-the-counter medicine.     · If you think your pain medicine is making you sick to your stomach:  ? Take your medicine after meals (unless your doctor has told you not to). ? Ask your doctor for a different pain medicine.     · If your doctor prescribed antibiotics, take them as directed. Do not stop taking them just because you feel better. You need to take the full course of antibiotics. Incision care    · If you have strips of tape on the incision, leave the tape on for a week or until it falls off.     · Wash the area daily with warm, soapy water, and pat it dry. Don't use hydrogen peroxide or alcohol, which can slow healing. You may cover the area with a gauze bandage if it weeps or rubs against clothing. Change the bandage every day.     · Keep the area clean and dry. Other instructions    · If you breastfeed your baby, you may be more comfortable while you are healing if you place the baby so that he or she is not resting on your belly. Try tucking your baby under your arm, with his or her body along the side you will be feeding on. Support your baby's upper body with your arm. With that hand you can control your baby's head to bring his or her mouth to your breast. This is sometimes called the football hold. Follow-up care is a key part of your treatment and safety. Be sure to make and go to all appointments, and call your doctor if you are having problems. It's also a good idea to know your test results and keep a list of the medicines you take. When should you call for help? Call  911 anytime you think you may need emergency care. For example, call if:    · You have thoughts of harming yourself, your baby, or another person.     · You passed out (lost consciousness).     · You have chest pain, are short of breath, or cough up blood.     · You have a seizure.    Call your doctor now or seek immediate medical care if:    · You have pain that does not get better after you take pain medicine.     · You have severe vaginal bleeding.     · You are dizzy or lightheaded, or you feel like you may faint.     · You have new or worse pain in your belly or pelvis.     · You have loose stitches, or your incision comes open.     · You have symptoms of infection, such as:  ? Increased pain, swelling, warmth, or redness. ? Red streaks leading from the incision. ? Pus draining from the incision. ? A fever.     · You have symptoms of a blood clot in your leg (called a deep vein thrombosis), such as:  ? Pain in your calf, back of the knee, thigh, or groin. ? Redness and swelling in your leg or groin.     · You have signs of preeclampsia, such as:  ? Sudden swelling of your face, hands, or feet. ? New vision problems (such as dimness, blurring, or seeing spots). ? A severe headache. Watch closely for changes in your health, and be sure to contact your doctor if:    · You do not get better as expected. Where can you learn more? Go to http://www.smart.com/  Enter M806 in the search box to learn more about \" Section: What to Expect at Home. \"  Current as of: 2020               Content Version: 12.6  © 5818-4142 [employer]. Care instructions adapted under license by [employer] (which disclaims liability or warranty for this information). If you have questions about a medical condition or this instruction, always ask your healthcare professional. [place] [address] any warranty or liability for your use of this information.

## 2020-10-04 NOTE — PROGRESS NOTES
Post-Operative Day Number 2 Progress/Discharge Note    Patient doing well post-op day 2 from  delivery without significant complaints. Pain controlled on current medication. Voiding without difficulty, normal lochia. Desires discharge not taking pain meds    Vitals:    Patient Vitals for the past 8 hrs:   BP Temp Pulse Resp SpO2   10/04/20 0734 (!) 103/59 98.2 °F (36.8 °C) 69 18 98 %     Temp (24hrs), Av.3 °F (36.8 °C), Min:98.2 °F (36.8 °C), Max:98.4 °F (36.9 °C)      Vital signs stable, afebrile. Exam:  Patient without distress. Abdomen soft, fundus firm at level of umbilicus, non tender. Incision dry and                      clean without erythema. Lower extremities are negative for swelling, cords or tenderness. Lab/Data Review:  CBC: No results found for: WBC, HGB, HGBEXT, HCT, HCTEXT, PLT, PLTEXT, HGBEXT, HCTEXT, PLTEXT    Assessment and Plan:  Patient appears to be having uncomplicated post- course. Continue routine post-op care and maternal education. Plan discharge for today with follow up in our office in 1-2 weeks.

## 2020-10-06 ENCOUNTER — PATIENT OUTREACH (OUTPATIENT)
Dept: CASE MANAGEMENT | Age: 30
End: 2020-10-06

## 2020-10-06 NOTE — PROGRESS NOTES
Unable to reach patient by phone after ED/Hospital visit for at risk COVID education. Left message. Visit not related to [facility], and does not have a [facility] PCP, so only one attempt needed.

## 2020-10-06 NOTE — DISCHARGE SUMMARY
Obstetrical Discharge Summary     Name: Karen Mckeon MRN: [id number]  SSN: [id number]    YOB: 1990  Age: 31 y.o. Sex: female      Allergies: Patient has no known allergies. Admit Date: 10/2/2020    Discharge Date: 10/6/2020     Admitting Physician: Swapnil Law MD     Attending Physician:  No att. providers found     * Admission Diagnoses: Uterine contractions during pregnancy [O62.2]    * Discharge Diagnoses:   Information for the patient's :  Jl Phipps [[id number]]   Delivery of a 7 lb 11.1 oz (3.49 kg) male infant via , Low Transverse on 10/2/2020 at 8:36 PM  by Soraida Hancock. Apgars were 8  and 9 .        Additional Diagnoses:   Hospital Problems as of 10/4/2020 Date Reviewed: 10/2/2020          Codes Class Noted - Resolved POA    * (Principal) Uterine contractions during pregnancy ICD-10-CM: O62.2  ICD-9-CM: 661.20  10/2/2020 - Present Unknown             Lab Results   Component Value Date/Time    ABO/Rh(D) O POSITIVE 10/02/2020 11:14 AM    Rubella, External immune 2020 09:08 AM    GrBStrep, External Negative 2020    ABO,Rh O pos 2020 09:08 AM      Immunization History   Administered Date(s) Administered    Hep B, Adol/Ped 1992, 1993, 1995    Hib 1992, 1992, 1992, 1993    MMR 1993, 1996    OPV 1992, 1992, 1993, 1993, 1996    Tdap 2020       * Procedures: csec  Procedure(s):   SECTION      [place]  Depression Scale  I have been able to laugh and see the funny side of things: As much as I always could  I have looked forward with enjoyment to things: As much as I ever did  I have blamed myself unnecessarily when things went wrong: No, never  I have been anxious or worried for no good reason: No, not at all  I have felt scared or panicky for no very good reason: No, not at all  Things have been getting on top of me: No, I have been coping as well as ever  I have been so unhappy that I have had difficulty sleeping: No, not at all  I have felt sad or miserable: No, not at all  I have been so unhappy that I have been crying: No, never  The thought of harming myself has occurred to me: Never  Total Score: 0    * Discharge Condition: good    * Hospital Course: Normal hospital course following the delivery. * Disposition: Home    Discharge Medications:   Discharge Medication List as of 10/4/2020  1:28 PM      START taking these medications    Details   oxyCODONE-acetaminophen (PERCOCET) 5-325 mg per tablet Take 1 Tab by mouth every eight (8) hours as needed for Pain for up to 3 days. Max Daily Amount: 3 Tabs., Normal, Disp-10 Tab,R-0      ibuprofen (MOTRIN) 800 mg tablet Take 1 Tab by mouth every six (6) hours as needed for Pain., Normal, Disp-30 Tab,R-0         CONTINUE these medications which have NOT CHANGED    Details   PNV66-Iron Fumarate-FA-DSS-DHA 26-1.2- mg cap Take  by mouth., Historical Med      esomeprazole (NexIUM) 20 mg capsule Take  by mouth daily. , Historical Med         STOP taking these medications       acetaminophen (TylenoL) 325 mg tablet Comments:   Reason for Stopping:               * Follow-up Care/Patient Instructions: Activity: No sex for 6 weeks      Follow-up Information     Follow up With Specialties Details Why Contact Info    [facility], Not On File    Not On File (62) Patient has a PCP but that physician is not listed in [address] [place] [address].

## 2020-10-19 PROBLEM — O09.30 LATE PRENATAL CARE: Status: RESOLVED | Noted: 2020-04-22 | Resolved: 2020-10-19

## 2020-10-19 PROBLEM — K58.9 IRRITABLE BOWEL SYNDROME AFFECTING PREGNANCY IN SECOND TRIMESTER: Status: RESOLVED | Noted: 2018-11-15 | Resolved: 2020-10-19

## 2020-10-19 PROBLEM — O09.92 HIGH-RISK PREGNANCY IN SECOND TRIMESTER: Status: RESOLVED | Noted: 2020-04-22 | Resolved: 2020-10-19

## 2020-10-19 PROBLEM — O99.612 IRRITABLE BOWEL SYNDROME AFFECTING PREGNANCY IN SECOND TRIMESTER: Status: RESOLVED | Noted: 2018-11-15 | Resolved: 2020-10-19

## 2022-03-19 PROBLEM — Z23 ENCOUNTER FOR IMMUNIZATION: Status: ACTIVE | Noted: 2020-09-25

## 2022-03-19 PROBLEM — C71.9 ASTROCYTOMA BRAIN TUMOR (HCC): Status: ACTIVE | Noted: 2019-03-19

## 2022-06-09 ENCOUNTER — OFFICE VISIT (OUTPATIENT)
Dept: OBGYN CLINIC | Age: 32
End: 2022-06-09
Payer: MEDICAID

## 2022-06-09 VITALS
SYSTOLIC BLOOD PRESSURE: 106 MMHG | HEIGHT: 67 IN | WEIGHT: 178.8 LBS | BODY MASS INDEX: 28.06 KG/M2 | DIASTOLIC BLOOD PRESSURE: 82 MMHG

## 2022-06-09 DIAGNOSIS — Z13.89 SCREENING FOR GENITOURINARY CONDITION: ICD-10-CM

## 2022-06-09 DIAGNOSIS — N92.1 MENORRHAGIA WITH IRREGULAR CYCLE: Primary | ICD-10-CM

## 2022-06-09 LAB
BILIRUBIN, URINE, POC: NEGATIVE
BLOOD URINE, POC: NEGATIVE
GLUCOSE URINE, POC: NEGATIVE
KETONES, URINE, POC: NEGATIVE
LEUKOCYTE ESTERASE, URINE, POC: NEGATIVE
NITRITE, URINE, POC: NEGATIVE
PH, URINE, POC: 6.5 (ref 4.6–8)
PROTEIN,URINE, POC: NEGATIVE
SPECIFIC GRAVITY, URINE, POC: 1.03 (ref 1–1.03)
URINALYSIS CLARITY, POC: CLEAR
URINALYSIS COLOR, POC: YELLOW
UROBILINOGEN, POC: NORMAL

## 2022-06-09 PROCEDURE — 99214 OFFICE O/P EST MOD 30 MIN: CPT | Performed by: NURSE PRACTITIONER

## 2022-06-09 PROCEDURE — 81003 URINALYSIS AUTO W/O SCOPE: CPT | Performed by: NURSE PRACTITIONER

## 2022-06-09 RX ORDER — DOCUSATE SODIUM 100 MG/1
100 CAPSULE, LIQUID FILLED ORAL 2 TIMES DAILY
COMMUNITY

## 2022-06-09 RX ORDER — IBUPROFEN 800 MG/1
800 TABLET ORAL EVERY 6 HOURS PRN
COMMUNITY

## 2022-06-09 RX ORDER — PHENTERMINE HYDROCHLORIDE 37.5 MG/1
37.5 CAPSULE ORAL EVERY MORNING
COMMUNITY

## 2022-06-09 NOTE — PROGRESS NOTES
The patient is a 29 y.o. Kade Woody who is seen for AUB. States stopped OCP 2mo ago. Took 1 mo off and restarted 1mo ago. Is currently on the last row of her pill pack and has been bleeding x2 weeks and is heavy. Is changing pad/tampon every 2 hours. Prior to stopping OCP cycles every 28, but towards the end of her pack/beginning of next pack vs beginning of 4th week. States just wanted to make sure all was ok. Denies pain/bleeding with intercourse. HISTORY:      Patient's last menstrual period was 2022 (approximate). Sexual History:  has sex with males  Contraception:  OCP (estrogen/progesterone)  Current Outpatient Medications on File Prior to Visit   Medication Sig Dispense Refill    Multiple Vitamins-Minerals (WOMENS ONE DAILY PO) Take by mouth      docusate sodium (COLACE) 100 mg capsule Take 100 mg by mouth 2 times daily      ibuprofen (ADVIL;MOTRIN) 800 MG tablet Take 800 mg by mouth every 6 hours as needed for Pain      phentermine (ADIPEX-P) 37.5 MG capsule Take 37.5 mg by mouth every morning.  Biotin 2.5 MG CAPS Take by mouth      drospirenone-ethinyl estradiol 3-0.03 MG TABS Take 1 tablet by mouth daily      esomeprazole (NEXIUM) 20 MG delayed release capsule Take by mouth daily       No current facility-administered medications on file prior to visit. ROS:  Feeling well. No dyspnea or chest pain on exertion. No abdominal pain, change in bowel habits, black or bloody stools. No urinary tract symptoms. GYN ROS: see above. PHYSICAL EXAM:  Blood pressure 106/82, height 5' 7\" (1.702 m), weight 178 lb 12.8 oz (81.1 kg), last menstrual period 2022. The patient appears well, alert, oriented x 3, in no distress. ASSESSMENT:  Encounter Diagnosis   Name Primary?     Menorrhagia with irregular cycle Yes       PLAN:  All questions answered   Reassured pt that when restarting OCP, can take 3mo to regulate cycles  Will go ahead and check labs today to eval common contributors to AUB  If labs normal, give OCP 3mo to reach max efficacy and otherwise manage conservatively   Will reschedule pap 10-14 days out due to bleeding today  Pt agreeable and happy with plan      Orders Placed This Encounter   Procedures    TSH     Standing Status:   Future     Number of Occurrences:   1     Standing Expiration Date:   6/9/2023    T4, Free     Standing Status:   Future     Number of Occurrences:   1     Standing Expiration Date:   6/9/2023    Hemoglobin A1C     Standing Status:   Future     Number of Occurrences:   1     Standing Expiration Date:   6/9/2023    Vitamin D 25 Hydroxy     Standing Status:   Future     Number of Occurrences:   1     Standing Expiration Date:   6/9/2023    AMB POC URINALYSIS DIP STICK AUTO W/O MICRO         Supervising physician is Dr. Ruth Valverde. Greater than 50% of the 25 minute visit were spent in counseling to the above topics.

## 2022-06-10 LAB
25(OH)D3 SERPL-MCNC: 32.9 NG/ML (ref 30–100)
EST. AVERAGE GLUCOSE BLD GHB EST-MCNC: 111 MG/DL
HBA1C MFR BLD: 5.5 % (ref 4.2–6.3)
T4 FREE SERPL-MCNC: 1.2 NG/DL (ref 0.78–1.46)
TSH, 3RD GENERATION: 1.09 UIU/ML (ref 0.36–3.74)

## 2022-06-11 ENCOUNTER — PATIENT MESSAGE (OUTPATIENT)
Dept: OBGYN CLINIC | Age: 32
End: 2022-06-11

## 2022-06-13 RX ORDER — DROSPIRENONE AND ETHINYL ESTRADIOL 0.03MG-3MG
1 KIT ORAL DAILY
Qty: 1 PACKET | Refills: 0 | Status: SHIPPED | OUTPATIENT
Start: 2022-06-13 | End: 2022-06-24 | Stop reason: SDUPTHER

## 2022-06-13 NOTE — TELEPHONE ENCOUNTER
From: Cynthia Soria  To: [facility]  Sent: 6/11/2022 10:05 AM EDT  Subject: Medication    Good morning,   When I was at my appointment on Thursday, we did not discuss a refill on my birth control medication. Could you please send that in at your earliest convenience? Thank you.

## 2022-06-24 ENCOUNTER — OFFICE VISIT (OUTPATIENT)
Dept: OBGYN CLINIC | Age: 32
End: 2022-06-24
Payer: MEDICAID

## 2022-06-24 VITALS — HEIGHT: 67 IN | WEIGHT: 176.2 LBS | BODY MASS INDEX: 27.65 KG/M2

## 2022-06-24 DIAGNOSIS — Z11.51 SCREENING FOR HUMAN PAPILLOMAVIRUS (HPV): ICD-10-CM

## 2022-06-24 DIAGNOSIS — Z01.419 WELL WOMAN EXAM: Primary | ICD-10-CM

## 2022-06-24 DIAGNOSIS — Z12.4 PAP SMEAR FOR CERVICAL CANCER SCREENING: ICD-10-CM

## 2022-06-24 DIAGNOSIS — Z13.89 SCREENING FOR GENITOURINARY CONDITION: ICD-10-CM

## 2022-06-24 LAB
BILIRUBIN, URINE, POC: NEGATIVE
BLOOD URINE, POC: NEGATIVE
GLUCOSE URINE, POC: NEGATIVE
KETONES, URINE, POC: NEGATIVE
LEUKOCYTE ESTERASE, URINE, POC: NEGATIVE
NITRITE, URINE, POC: NEGATIVE
PH, URINE, POC: 7 (ref 4.6–8)
PROTEIN,URINE, POC: NEGATIVE
SPECIFIC GRAVITY, URINE, POC: 1.02 (ref 1–1.03)
URINALYSIS CLARITY, POC: CLEAR
URINALYSIS COLOR, POC: YELLOW
UROBILINOGEN, POC: NORMAL

## 2022-06-24 PROCEDURE — 99395 PREV VISIT EST AGE 18-39: CPT | Performed by: NURSE PRACTITIONER

## 2022-06-24 PROCEDURE — 81002 URINALYSIS NONAUTO W/O SCOPE: CPT | Performed by: NURSE PRACTITIONER

## 2022-06-24 RX ORDER — DROSPIRENONE AND ETHINYL ESTRADIOL 0.03MG-3MG
1 KIT ORAL DAILY
Qty: 3 PACKET | Refills: 4 | Status: SHIPPED | OUTPATIENT
Start: 2022-06-24

## 2022-06-24 NOTE — PROGRESS NOTES
Patient presents today for a routine gynecological examination with no complaints. OB History        1    Para   1    Term   1       0    AB   0    Living   1       SAB        IAB        Ectopic        Molar        Multiple        Live Births   1              Last pap smear: 20 (Negative, gc/chl/trich negative) HPV not performed  Last mammogram: NA    Last colonoscopy: NA  Last DEXA: NA    GYN History           Patient's last menstrual period was 2022 (approximate). 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[facility])     had dubulking surgery and chemo, [facility]    Chlamydia      tx'd    Chronic tension-type headache, not intractable 2019    IBS (irritable bowel syndrome)        Past Surgical History:  Past Surgical History:   Procedure Laterality Date    OTHER SURGICAL HISTORY      brain tumor debulking surgery 3/2019       Allergies:   No Known Allergies    Medication History:  Current Outpatient Medications   Medication Sig Dispense Refill    Cholecalciferol (VITAMIN D3) 125 MCG (5000 UT) TABS Take by mouth      drospirenone-ethinyl estradiol 3-0.03 MG TABS Take 1 tablet by mouth daily 3 packet 4    Multiple Vitamins-Minerals (WOMENS ONE DAILY PO) Take by mouth      docusate sodium (COLACE) 100 mg capsule Take 100 mg by mouth 2 times daily      ibuprofen (ADVIL;MOTRIN) 800 MG tablet Take 800 mg by mouth every 6 hours as needed for Pain      phentermine (ADIPEX-P) 37.5 MG capsule Take 37.5 mg by mouth every morning.  Biotin 2.5 MG CAPS Take by mouth      esomeprazole (NEXIUM) 20 MG delayed release capsule Take by mouth daily       No current facility-administered medications for this visit.        Social History:  Social History     Socioeconomic History    Marital status:      Spouse name: Not on file    Number of children: Not on file    Years of education: Not on file    Highest education level: Not on file   Occupational History    Not on file   Tobacco Use    Smoking status: Former Smoker    Smokeless tobacco: Never Used    Tobacco comment: Quit smoking: quit with +UPT   Substance and Sexual Activity    Alcohol use: Not Currently    Drug use: Not Currently     Types: Marijuana Rodena Capes)    Sexual activity: Yes     Partners: Male     Birth control/protection: None   Other Topics Concern    Not on file   Social History Narrative    Not on file     Social Determinants of Health     Financial Resource Strain:     Difficulty of Paying Living Expenses: Not on file   Food Insecurity:     Worried About Running Out of Food in the Last Year: Not on file    Deng of Food in the Last Year: Not on file   Transportation Needs:     Lack of Transportation (Medical): Not on file    Lack of Transportation (Non-Medical):  Not on file   Physical Activity:     Days of Exercise per Week: Not on file    Minutes of Exercise per Session: Not on file   Stress:     Feeling of Stress : Not on file   Social Connections:     Frequency of Communication with Friends and Family: Not on file    Frequency of Social Gatherings with Friends and Family: Not on file    Attends Muslim Services: Not on file    Active Member of [address] [place] [address] [employer] or Organizations: Not on file    Attends Club or Organization Meetings: Not on file    Marital Status: Not on file   Intimate Partner Violence:     Fear of Current or Ex-Partner: Not on file    Emotionally Abused: Not on file    Physically Abused: Not on file    Sexually Abused: Not on file   Housing Stability:     Unable to Pay for Housing in the Last Year: Not on file    Number of [place] in the Last Year: Not on file    Unstable Housing in the Last Year: Not on file       Family History:  Family History   Problem Relation Age of Onset    No Known Problems Mother     No Known prn    Supervising physician is Dr. Ronaldo Jackson.

## 2022-06-24 NOTE — PROGRESS NOTES
Patient presents today for a routine gynecological examination with no complaints. OB History        1    Para   1    Term   1       0    AB   0    Living   1       SAB        IAB        Ectopic        Molar        Multiple        Live Births   1              Last pap smear: 20 (Negative, gc/chl/trich negative) HPV not performed  Last mammogram: NA    Last colonoscopy: NA  Last DEXA: NA    GYN History           Patient's last menstrual period was 2022 (approximate). Cycle Length {Numbers; 0-100:30910} Lasting {Numbers; 0-10:25885}  {Pos/neg trace:18100} dysmenorrhea; {Pos/neg trace:53388} postcoital bleeding    Past Medical History:  Past Medical History:   Diagnosis Date    Brain tumor, astrocytoma ([address]     had dubulking surgery and chemo, followed by oncology @[facility]    Chlamydia      tx'd    Chronic tension-type headache, not intractable 2019    IBS (irritable bowel syndrome)        Past Surgical History:  Past Surgical History:   Procedure Laterality Date    OTHER SURGICAL HISTORY      brain tumor debulking surgery 3/2019       Allergies:   No Known Allergies    Medication History:  Current Outpatient Medications   Medication Sig Dispense Refill    drospirenone-ethinyl estradiol 3-0.03 MG TABS Take 1 tablet by mouth daily 1 packet 0    Multiple Vitamins-Minerals (WOMENS ONE DAILY PO) Take by mouth      docusate sodium (COLACE) 100 mg capsule Take 100 mg by mouth 2 times daily      ibuprofen (ADVIL;MOTRIN) 800 MG tablet Take 800 mg by mouth every 6 hours as needed for Pain      phentermine (ADIPEX-P) 37.5 MG capsule Take 37.5 mg by mouth every morning.  Biotin 2.5 MG CAPS Take by mouth      esomeprazole (NEXIUM) 20 MG delayed release capsule Take by mouth daily       No current facility-administered medications for this visit.        Social History:  Social History     Socioeconomic History    Marital status:      Spouse name: Not on file    Number of children: Not on file    Years of education: Not on file    Highest education level: Not on file   Occupational History    Not on file   Tobacco Use    Smoking status: Former Smoker    Smokeless tobacco: Never Used    Tobacco comment: Quit smoking: quit with +UPT   Substance and Sexual Activity    Alcohol use: Not Currently    Drug use: Not Currently     Types: Marijuana Norval Remak)    Sexual activity: Yes     Partners: Male     Birth control/protection: None   Other Topics Concern    Not on file   Social History Narrative    Not on file     Social Determinants of Health     Financial Resource Strain:     Difficulty of Paying Living Expenses: Not on file   Food Insecurity:     Worried About Running Out of Food in the Last Year: Not on file    Deng of Food in the Last Year: Not on file   Transportation Needs:     Lack of Transportation (Medical): Not on file    Lack of Transportation (Non-Medical):  Not on file   Physical Activity:     Days of Exercise per Week: Not on file    Minutes of Exercise per Session: Not on file   Stress:     Feeling of Stress : Not on file   Social Connections:     Frequency of Communication with Friends and Family: Not on file    Frequency of Social Gatherings with Friends and Family: Not on file    Attends Protestant Services: Not on file    Active Member of [address] [place] [address] [employer] or Organizations: Not on file    Attends Club or Organization Meetings: Not on file    Marital Status: Not on file   Intimate Partner Violence:     Fear of Current or Ex-Partner: Not on file    Emotionally Abused: Not on file    Physically Abused: Not on file    Sexually Abused: Not on file   Housing Stability:     Unable to Pay for Housing in the Last Year: Not on file    Number of [place] in the Last Year: Not on file    Unstable Housing in the Last Year: Not on file       Family History:  Family History   Problem Relation Age of Onset    No Known Problems Mother     No Known Problems Brother  COPD Father        Review of Systems - General ROS: negative except for that discussed in HPI      ROS:  Feeling well. No dyspnea or chest pain on exertion. No abdominal pain, change in bowel habits, black or bloody stools. No urinary tract symptoms. No neurological complaints. Objective:   LMP 05/25/2022 (Approximate)   The patient appears well, alert, oriented x 3, in no distress. ENT normal.  Neck supple. No adenopathy or thyromegaly. Lungs:  clear, good air entry, no wheezes, rhonchi or rales. Heart:  S1 and S2 normal, no murmurs, regular rate and rhythm. Abdomen:  soft without tenderness, guarding, mass or organomegaly. Extremities show no edema, normal peripheral pulses. Neurological is normal, no focal findings. BREAST EXAM: {pe breast exam:590185::\"breasts appear normal, no suspicious masses, no skin or nipple changes or axillary nodes\"}    PELVIC EXAM: {pelvic exam:788831::\"normal external genitalia, vulva, vagina, cervix, uterus and adnexa\"}    Assessment/Plan:     There are no diagnoses linked to this encounter. {gyn BEEZ:292167::\"CZAWEYICP\",\"JEJ smear\",\"return annually or prn\"}    Supervising physician is Dr. Teri Shaw

## 2022-06-29 LAB
CYTOLOGIST CVX/VAG CYTO: NORMAL
CYTOLOGY CVX/VAG DOC THIN PREP: NORMAL
HPV APTIMA: NEGATIVE
Lab: NORMAL
Lab: NORMAL
PATH REPORT.FINAL DX SPEC: NORMAL
STAT OF ADQ CVX/VAG CYTO-IMP: NORMAL

## 2023-09-07 ENCOUNTER — OFFICE VISIT (OUTPATIENT)
Dept: OBGYN CLINIC | Age: 33
End: 2023-09-07
Payer: MEDICAID

## 2023-09-07 VITALS
SYSTOLIC BLOOD PRESSURE: 110 MMHG | HEIGHT: 67 IN | WEIGHT: 188 LBS | BODY MASS INDEX: 29.51 KG/M2 | DIASTOLIC BLOOD PRESSURE: 78 MMHG

## 2023-09-07 DIAGNOSIS — Z01.419 WELL WOMAN EXAM: Primary | ICD-10-CM

## 2023-09-07 DIAGNOSIS — Z30.41 SURVEILLANCE FOR BIRTH CONTROL, ORAL CONTRACEPTIVES: ICD-10-CM

## 2023-09-07 DIAGNOSIS — Z13.89 SCREENING FOR GENITOURINARY CONDITION: ICD-10-CM

## 2023-09-07 LAB
BILIRUBIN, URINE, POC: NEGATIVE
BLOOD URINE, POC: NORMAL
GLUCOSE URINE, POC: NEGATIVE
KETONES, URINE, POC: NEGATIVE
LEUKOCYTE ESTERASE, URINE, POC: NEGATIVE
NITRITE, URINE, POC: NEGATIVE
PH, URINE, POC: 7.5 (ref 4.6–8)
PROTEIN,URINE, POC: NEGATIVE
SPECIFIC GRAVITY, URINE, POC: 1.02 (ref 1–1.03)
URINALYSIS CLARITY, POC: CLEAR
URINALYSIS COLOR, POC: YELLOW
UROBILINOGEN, POC: NORMAL

## 2023-09-07 PROCEDURE — 81003 URINALYSIS AUTO W/O SCOPE: CPT | Performed by: NURSE PRACTITIONER

## 2023-09-07 PROCEDURE — 99395 PREV VISIT EST AGE 18-39: CPT | Performed by: NURSE PRACTITIONER

## 2023-09-07 RX ORDER — DROSPIRENONE AND ETHINYL ESTRADIOL 0.03MG-3MG
1 KIT ORAL DAILY
Qty: 3 PACKET | Refills: 4 | Status: SHIPPED | OUTPATIENT
Start: 2023-09-07

## 2023-09-07 NOTE — PROGRESS NOTES
Patient presents today for a routine gynecological examination stating she has pelvic pain during intercourse. Describes pain as a deeper pain vs pain with penetration. Does not seem to be unilateral in nature. Denies bleeding with intercourse. Does not seem positional. Has noticed more the last 6mo-1yr. Also notes she has noticed increasing pain with her cycles in addition to pain throughout the month. Cycles every 28 lasting 5. Denies menorrhagia. Has been taking OCP faithfully. OB History          1    Para   1    Term   1       0    AB   0    Living   1         SAB        IAB        Ectopic        Molar        Multiple        Live Births   1              Last pap:  negative, hpv negative    GYN History           Patient's last menstrual period was 2023 (exact date). Cycle Length 28 Lasting 5  negative dysmenorrhea; negative postcoital bleeding    Past Medical History:  Past Medical History:   Diagnosis Date    Brain tumor, astrocytoma ([address])     had dubulking surgery and chemo, followed by oncology [facility]    Chlamydia      tx'd    Chronic tension-type headache, not intractable 2019    IBS (irritable bowel syndrome)        Past Surgical History:  Past Surgical History:   Procedure Laterality Date    OTHER SURGICAL HISTORY      brain tumor debulking surgery 3/2019       Allergies:   No Known Allergies    Medication History:  Current Outpatient Medications   Medication Sig Dispense Refill    drospirenone-ethinyl estradiol 3-0.03 MG TABS Take 1 tablet by mouth daily 3 packet 4    Cholecalciferol (VITAMIN D3) 125 MCG (5000 UT) TABS Take by mouth      Multiple Vitamins-Minerals (WOMENS ONE DAILY PO) Take by mouth      docusate sodium (COLACE) 100 mg capsule Take 1 capsule by mouth 2 times daily      Biotin 2.5 MG CAPS Take by mouth      esomeprazole (NEXIUM) 20 MG delayed release capsule Take by mouth daily       No current facility-administered medications for this visit.

## 2023-09-18 NOTE — PROGRESS NOTES
The patient is a 34 y.o. Kori Woody who is seen for TVUS to evaluate pelvic pain during intercourse. Has not started OCP yet (ran out prior to last appt) as she's waiting on cycle to start. Note from 23:  Describes pain as a deeper pain vs pain with penetration. Does not seem to be unilateral in nature. Denies bleeding with intercourse. Does not seem positional. Has noticed more the last 6mo-1yr. Also notes she has noticed increasing pain with her cycles in addition to pain throughout the month. Cycles every 28 lasting 5. Denies menorrhagia. Has been taking OCP faithfully. TVUS today:   UT- anteverted and very heterogeneous  Endo- 8.6mm, no definite intracavitary masses visualized  ROV- CLC  Both ovaries have increase number of follicles around periphery, c/w PCOS  Bilateral adnexas appear wnl  No free fluid present    HISTORY:      Patient's last menstrual period was 2023 (approximate). Sexual History:  sexually active   Contraception:  OCPs  Current Outpatient Medications on File Prior to Visit   Medication Sig Dispense Refill    ibuprofen (ADVIL;MOTRIN) 800 MG tablet Take 1 tablet by mouth every 6 hours as needed for Pain      drospirenone-ethinyl estradiol 3-0.03 MG TABS Take 1 tablet by mouth daily 3 packet 4    Cholecalciferol (VITAMIN D3) 125 MCG (5000 UT) TABS Take by mouth      Multiple Vitamins-Minerals (WOMENS ONE DAILY PO) Take by mouth      docusate sodium (COLACE) 100 mg capsule Take 1 capsule by mouth 2 times daily      Biotin 2.5 MG CAPS Take by mouth      esomeprazole (NEXIUM) 20 MG delayed release capsule Take by mouth daily       No current facility-administered medications on file prior to visit. ROS:  Feeling well. No dyspnea or chest pain on exertion. No abdominal pain, change in bowel habits, black or bloody stools. No urinary tract symptoms.  GYN ROS: normal menses, no abnormal bleeding, pelvic pain or discharge, no breast pain or new or enlarging lumps on self

## 2023-09-19 ENCOUNTER — PROCEDURE VISIT (OUTPATIENT)
Dept: OBGYN CLINIC | Age: 33
End: 2023-09-19
Payer: MEDICAID

## 2023-09-19 ENCOUNTER — OFFICE VISIT (OUTPATIENT)
Dept: OBGYN CLINIC | Age: 33
End: 2023-09-19
Payer: MEDICAID

## 2023-09-19 VITALS
SYSTOLIC BLOOD PRESSURE: 106 MMHG | HEIGHT: 67 IN | DIASTOLIC BLOOD PRESSURE: 78 MMHG | WEIGHT: 188.3 LBS | BODY MASS INDEX: 29.55 KG/M2

## 2023-09-19 DIAGNOSIS — E28.2 PCOS (POLYCYSTIC OVARIAN SYNDROME): ICD-10-CM

## 2023-09-19 DIAGNOSIS — N94.10 DYSPAREUNIA IN FEMALE: Primary | ICD-10-CM

## 2023-09-19 DIAGNOSIS — N94.6 DYSMENORRHEA: ICD-10-CM

## 2023-09-19 PROCEDURE — 76830 TRANSVAGINAL US NON-OB: CPT | Performed by: OBSTETRICS & GYNECOLOGY

## 2023-09-19 PROCEDURE — 99214 OFFICE O/P EST MOD 30 MIN: CPT | Performed by: NURSE PRACTITIONER

## 2023-09-19 RX ORDER — NAPROXEN 500 MG/1
500 TABLET ORAL 2 TIMES DAILY PRN
Qty: 180 TABLET | Refills: 1 | Status: SHIPPED | OUTPATIENT
Start: 2023-09-19

## 2023-09-19 RX ORDER — IBUPROFEN 800 MG/1
800 TABLET ORAL EVERY 6 HOURS PRN
COMMUNITY

## 2023-09-20 ENCOUNTER — HOSPITAL ENCOUNTER (OUTPATIENT)
Dept: PHYSICAL THERAPY | Age: 33
Setting detail: RECURRING SERIES
Discharge: HOME | End: 2023-09-23
Payer: MEDICAID

## 2023-09-20 DIAGNOSIS — M25.311 OTHER INSTABILITY, RIGHT SHOULDER: ICD-10-CM

## 2023-09-20 DIAGNOSIS — M75.41 IMPINGEMENT SYNDROME OF RIGHT SHOULDER: ICD-10-CM

## 2023-09-20 DIAGNOSIS — R29.3 ABNORMAL POSTURE: ICD-10-CM

## 2023-09-20 DIAGNOSIS — G89.29 CHRONIC RIGHT SHOULDER PAIN: Primary | ICD-10-CM

## 2023-09-20 DIAGNOSIS — M25.511 CHRONIC RIGHT SHOULDER PAIN: Primary | ICD-10-CM

## 2023-09-20 PROCEDURE — 97162 PT EVAL MOD COMPLEX 30 MIN: CPT

## 2023-09-20 NOTE — THERAPY EVALUATION
Fran Has  : 1990  Primary: [employer] (Medicaid Managed)  Secondary:  [address] @ [facility] [address]  Phone: [phone]  Fax: [phone] Plan Frequency: 2 sessions per week for 6 weeks (With potential to reduce to 1 session per week)    Plan of Care/Certification Expiration Date: 23 (Start of POC: 23)      PT Visit Info:  Plan Frequency: 2 sessions per week for 6 weeks (With potential to reduce to 1 session per week)  Plan of Care/Certification Expiration Date: 23 (Start of POC: 23)      Visit Count:  1                OUTPATIENT PHYSICAL THERAPY:             OP NOTE TYPE: Initial Assessment 2023               Episode (R Shoulder Pain) Appt Desk         Treatment Diagnosis:    Visit Diagnoses         Codes    Chronic right shoulder pain    -  Primary M25.511, G89.29    Other instability, right shoulder     M25.311    Abnormal posture     R29.3    Impingement syndrome of right shoulder     M75.41          Medical/Referring Diagnosis:  Impingement syndrome of right shoulder [M75.41]  Referring Physician:  Giles Roberts MD MD Orders:  PT Eval and Treat   Return MD Appt:  10/24/23  Date of Onset:  Onset Date: 21 (Chronic pain for about 2-3 years)      Allergies:  Patient has no known allergies. Restrictions/Precautions:           Medications Last Reviewed:  2023     SUBJECTIVE   History of Injury/Illness (Reason for Referral):  Ms. Peters Has has attended 1 physical therapy session including initial evaluation as of 2023. Fran Has is a 34 y.o. female with complaints of chronic R anterior shoulder pain. She has been feeling this about 2 years and denies a mechanism of injury. The patient has difficulty with cleaning but she experiences the pain the following day. She also has difficulty with writing and typing at work, and reaching for items overhead.

## 2023-09-20 NOTE — PROGRESS NOTES
Abel Ben  : 1990  Primary: [employer] (Medicaid Managed)  Secondary:  [address] @ [address] [facility] [address]  Phone: [phone]  Fax: [phone] Plan Frequency: 2 sessions per week for 6 weeks (With potential to reduce to 1 session per week)  Plan of Care/Certification Expiration Date: 23 (Start of POC: 23)      >PT Visit Info:  Plan Frequency: 2 sessions per week for 6 weeks (With potential to reduce to 1 session per week)  Plan of Care/Certification Expiration Date: 23 (Start of POC: 23)      Visit Count:  1    OUTPATIENT PHYSICAL THERAPY:OP NOTE TYPE: OP Note Type: Treatment Note 2023       Episode  }Appt Desk             Medical/Referring Diagnosis:  Impingement syndrome of right shoulder [M75.41]  Referring Physician:  Delsa Holstein, MD MD Orders:  PT Eval and Treat   Date of Onset:  Onset Date: 21 (Chronic pain for about 2-3 years)     Allergies:   Patient has no known allergies. Restrictions/Precautions:  No data recordedNo data recorded     Interventions Planned (Treatment may consist of any combination of the following):    Current Treatment Recommendations: Strengthening; ROM; Balance training; Functional mobility training; Endurance training; Neuromuscular re-education; Manual; Pain management; Return to work related activity; Home exercise program; Modalities; Dry needling; Therapeutic activities     >Subjective Comments:   Patient arrives to her initial evaluation with reports of R shoulder pain  >Initial:     3/10>Post Session:       3/10  Medications Last Reviewed:  2023  Updated Objective Findings:  See Evaluation Note from today  Treatment     THERAPEUTIC EXERCISE: (15 minutes):  NOT BILLED DUE TO INSURANCE  Exercises per grid below to improve mobility and strength.   Required moderate visual, verbal, manual, and tactile cues to promote proper body alignment,

## 2023-09-21 ASSESSMENT — PAIN SCALES - GENERAL: PAINLEVEL_OUTOF10: 3

## 2023-09-29 ENCOUNTER — HOSPITAL ENCOUNTER (OUTPATIENT)
Dept: PHYSICAL THERAPY | Age: 33
Setting detail: RECURRING SERIES
End: 2023-09-29
Payer: MEDICAID

## 2023-09-29 PROCEDURE — 97110 THERAPEUTIC EXERCISES: CPT

## 2023-09-29 ASSESSMENT — PAIN SCALES - GENERAL: PAINLEVEL_OUTOF10: 2

## 2023-09-29 NOTE — PROGRESS NOTES
Aman Ragland  : 1990  Primary: [employer] (Medicaid Managed)  Secondary:  [address] @ [address] [facility] [address]  Phone: [phone]  Fax: [phone] Plan Frequency: 2 sessions per week for 6 weeks (With potential to reduce to 1 session per week)  Plan of Care/Certification Expiration Date: 23 (Start of POC: 23)      >PT Visit Info:  Plan Frequency: 2 sessions per week for 6 weeks (With potential to reduce to 1 session per week)  Plan of Care/Certification Expiration Date: 23 (Start of POC: 23)      Visit Count:  2    OUTPATIENT PHYSICAL THERAPY:OP NOTE TYPE: OP Note Type: Treatment Note 2023       Episode  }Appt Desk             Medical/Referring Diagnosis:  Impingement syndrome of right shoulder [M75.41]  Referring Physician:  Jannet Bender MD MD Orders:  PT Eval and Treat   Date of Onset:  Onset Date: 21 (Chronic pain for about 2-3 years)     Allergies:   Patient has no known allergies. Restrictions/Precautions:  No data recordedNo data recorded     Interventions Planned (Treatment may consist of any combination of the following):    Current Treatment Recommendations: Strengthening; ROM; Balance training; Functional mobility training; Endurance training; Neuromuscular re-education; Manual; Pain management; Return to work related activity; Home exercise program; Modalities; Dry needling; Therapeutic activities     >Subjective Comments:  Pt. reported being too busy to perform HEP  >Initial:     2/10>Post Session:       2/10  Medications Last Reviewed:  2023  Updated Objective Findings:   Pt. 's right scapular winged during session  Treatment     THERAPEUTIC EXERCISE: (55 minutes):    Exercises per grid below to improve mobility and strength.   Required moderate visual, verbal, manual, and tactile cues to promote proper body alignment, promote proper body posture, promote proper

## 2023-10-06 ENCOUNTER — HOSPITAL ENCOUNTER (OUTPATIENT)
Dept: PHYSICAL THERAPY | Age: 33
Setting detail: RECURRING SERIES
End: 2023-10-06
Payer: MEDICAID

## 2023-10-13 ENCOUNTER — HOSPITAL ENCOUNTER (OUTPATIENT)
Dept: PHYSICAL THERAPY | Age: 33
Setting detail: RECURRING SERIES
End: 2023-10-13
Payer: MEDICAID

## 2023-10-20 ENCOUNTER — HOSPITAL ENCOUNTER (OUTPATIENT)
Dept: PHYSICAL THERAPY | Age: 33
Setting detail: RECURRING SERIES
Discharge: HOME | End: 2023-10-23
Payer: MEDICAID

## 2023-10-20 PROCEDURE — 97110 THERAPEUTIC EXERCISES: CPT

## 2023-10-20 ASSESSMENT — PAIN SCALES - GENERAL: PAINLEVEL_OUTOF10: 2

## 2023-10-20 NOTE — PROGRESS NOTES
Bird Pavan  : 1990  Primary: [employer] (Medicaid Managed)  Secondary:  [address] @ [address] [facility] [address]  Phone: [phone]  Fax: [phone] Plan Frequency: 2 sessions per week for 6 weeks (With potential to reduce to 1 session per week)  Plan of Care/Certification Expiration Date: 23 (Start of POC: 23)      >PT Visit Info:  Plan Frequency: 2 sessions per week for 6 weeks (With potential to reduce to 1 session per week)  Plan of Care/Certification Expiration Date: 23 (Start of POC: 23)      Visit Count:  3    OUTPATIENT PHYSICAL THERAPY:OP NOTE TYPE: OP Note Type: Treatment Note 10/20/2023       Episode  }Appt Desk             Medical/Referring Diagnosis:  Impingement syndrome of right shoulder [M75.41]  Referring Physician:  Saturnino Aragon MD MD Orders:  PT Eval and Treat   Date of Onset:  Onset Date: 21 (Chronic pain for about 2-3 years)     Allergies:   Patient has no known allergies. Restrictions/Precautions:  No data recordedNo data recorded     Interventions Planned (Treatment may consist of any combination of the following):    Current Treatment Recommendations: Strengthening; ROM; Balance training; Functional mobility training; Endurance training; Neuromuscular re-education; Manual; Pain management; Return to work related activity; Home exercise program; Modalities; Dry needling; Therapeutic activities     >Subjective Comments:  Pt. reported performing some of her exercises since last session  >Initial:     2/10>Post Session:       5/10  Medications Last Reviewed:  10/20/2023  Updated Objective Findings:   Pt. Had weakness eccentrically lowering arm with t-band  Treatment     THERAPEUTIC EXERCISE: (53 minutes):    Exercises per grid below to improve mobility and strength.   Required moderate visual, verbal, manual, and tactile cues to promote proper body alignment, promote

## 2023-10-25 ENCOUNTER — APPOINTMENT (OUTPATIENT)
Dept: PHYSICAL THERAPY | Age: 33
End: 2023-10-25
Payer: MEDICAID

## 2024-04-26 ENCOUNTER — HOSPITAL ENCOUNTER (OUTPATIENT)
Dept: PHYSICAL THERAPY | Age: 34
Setting detail: RECURRING SERIES
Discharge: HOME | End: 2024-04-29
Payer: MEDICAID

## 2024-04-26 DIAGNOSIS — M25.611 STIFFNESS OF RIGHT SHOULDER, NOT ELSEWHERE CLASSIFIED: ICD-10-CM

## 2024-04-26 DIAGNOSIS — M25.511 RIGHT SHOULDER PAIN, UNSPECIFIED CHRONICITY: Primary | ICD-10-CM

## 2024-04-26 PROCEDURE — 97162 PT EVAL MOD COMPLEX 30 MIN: CPT

## 2024-04-26 ASSESSMENT — PAIN SCALES - GENERAL: PAINLEVEL_OUTOF10: 5

## 2024-04-26 NOTE — THERAPY EVALUATION
Briana Delatorre  : 1990  Primary: [employer] (Medicaid Managed)  Secondary:  [facility] @ [place]  [address]  [facility] [address]  Phone: [phone]  Fax: [phone] Plan Frequency: 2 sessions per week for 90 days (with the potential to reduce to 1 session per week pending patient presentation and availibility.)    Plan of Care/Certification Expiration Date: 24        Plan of Care/Certification Expiration Date:  Plan of Care/Certification Expiration Date: 24    Frequency/Duration: Plan Frequency: 2 sessions per week for 90 days (with the potential to reduce to 1 session per week pending patient presentation and availibility.)      Time In/Out:   Time In: 1104  Time Out: 1202      PT Visit Info:         Visit Count:  Visit count could not be calculated. Make sure you are using a visit which is associated with an episode.                OUTPATIENT PHYSICAL THERAPY:             Initial Assessment 2024               Episode (No data found)         Treatment Diagnosis:     Right shoulder pain, unspecified chronicity  Stiffness of right shoulder, not elsewhere classified  Medical/Referring Diagnosis:    Impingement syndrome of right shoulder [M75.41]  Other specified arthritis, right shoulder [M13.811]  Superior glenoid labrum lesion of right shoulder, initial encounter [S43.431A]  Bicipital tendinitis, right shoulder [M75.21]      Referring Physician:  Hoenig, Michael P, MD MD Orders:  PT Eval and Treat   Return MD Appt:  5/10/24 follow-up  Date of Onset:  Onset Date: 24 (Date of surgery)     Allergies:  Patient has no known allergies.  Restrictions/Precautions:    Post Surgical Precautions: Biceps Tenodesis and posterior labrum       Medications Last Reviewed:  2024     SUBJECTIVE   History of Injury/Illness (Reason for Referral):  Ms. Briana Delatorre has attended 1 physical therapy session including initial

## 2024-04-26 NOTE — PROGRESS NOTES
Briana Atkinsps  : 1990  Primary: [employer] (Medicaid Managed)  Secondary:  [facility] @ [place]  [address]  [facility] [address]  Phone: [phone]  Fax: [phone] Plan Frequency: 2 sessions per week for 90 days (with the potential to reduce to 1 session per week pending patient presentation and availibility.)    Plan of Care/Certification Expiration Date: 24        Plan of Care/Certification Expiration Date:  Plan of Care/Certification Expiration Date: 24    Frequency/Duration:   Plan Frequency: 2 sessions per week for 90 days (with the potential to reduce to 1 session per week pending patient presentation and availibility.)      Time In/Out:   Time In: 1104  Time Out: 1202      PT Visit Info:         Visit Count:  Visit count could not be calculated. Make sure you are using a visit which is associated with an episode.    OUTPATIENT PHYSICAL THERAPY:   Treatment Note 2024       Episode  (No data found)               Treatment Diagnosis:    Right shoulder pain, unspecified chronicity  Stiffness of right shoulder, not elsewhere classified  Medical/Referring Diagnosis:    Impingement syndrome of right shoulder [M75.41]  Other specified arthritis, right shoulder [M13.811]  Superior glenoid labrum lesion of right shoulder, initial encounter [S43.431A]  Bicipital tendinitis, right shoulder [M75.21]      Referring Physician:  Hoenig, Michael P, MD MD Orders:  PT Eval and Treat   Return MD Appt:  5/10/24 surgeon f/u   Date of Onset:  Onset Date: 24 (Date of surgery)     Allergies:   Patient has no known allergies.  Restrictions/Precautions:   Post Surgical Precautions: Biceps Tenodesis and Labral Repair      Interventions Planned (Treatment may consist of any combination of the following):     See Assessment Note    Subjective Comments:   Patient reports R shoulder pain and stiffness due to shoulder surgery on

## 2024-04-30 ASSESSMENT — PAIN SCALES - GENERAL: PAINLEVEL_OUTOF10: 5

## 2024-05-01 ENCOUNTER — HOSPITAL ENCOUNTER (OUTPATIENT)
Dept: PHYSICAL THERAPY | Age: 34
Setting detail: RECURRING SERIES
Discharge: HOME | End: 2024-05-04
Payer: MEDICAID

## 2024-05-01 PROCEDURE — 97530 THERAPEUTIC ACTIVITIES: CPT

## 2024-05-01 PROCEDURE — 97140 MANUAL THERAPY 1/> REGIONS: CPT

## 2024-05-01 ASSESSMENT — PAIN SCALES - GENERAL: PAINLEVEL_OUTOF10: 4

## 2024-05-01 NOTE — PROGRESS NOTES
Briana Nandini  : 1990  Primary: [employer] (Medicaid Managed)  Secondary:  [facility] @ [place]  [address]  [facility] [address]  Phone: [phone]  Fax: [phone] Plan Frequency: 2 sessions per week for 90 days (with the potential to reduce to 1 session per week pending patient presentation and availibility.)    Plan of Care/Certification Expiration Date: 24        Plan of Care/Certification Expiration Date:  Plan of Care/Certification Expiration Date: 24    Frequency/Duration:   Plan Frequency: 2 sessions per week for 90 days (with the potential to reduce to 1 session per week pending patient presentation and availibility.)      Time In/Out:   Time In: 902  Time Out:       PT Visit Info:         Visit Count:  2    OUTPATIENT PHYSICAL THERAPY:   Treatment Note 2024       Episode  (S/p R shoulder surgery)               Treatment Diagnosis:    No data found  Medical/Referring Diagnosis:    Impingement syndrome of right shoulder [M75.41]  Other specified arthritis, right shoulder [M13.811]  Superior glenoid labrum lesion of right shoulder, initial encounter [S43.431A]  Bicipital tendinitis, right shoulder [M75.21]      Referring Physician:  Hoenig, Michael P, MD MD Orders:  PT Eval and Treat   Return MD Appt:  5/10/24 surgeon f/u   Date of Onset:  Onset Date: 24 (Date of surgery)     Allergies:   Patient has no known allergies.  Restrictions/Precautions:   Post Surgical Precautions: Biceps Tenodesis and Labral Repair      Interventions Planned (Treatment may consist of any combination of the following):     See Assessment Note    Subjective Comments:   Patient reports that she is doing okay. Still has moderate pain at times.   Initial Pain Level::     4/10  Post Session Pain Level:       3/10  Medications Last Reviewed:  2024  Updated Objective Findings:   very tight with ER beyond 0 deg  Treatment

## 2024-05-03 ENCOUNTER — HOSPITAL ENCOUNTER (OUTPATIENT)
Dept: PHYSICAL THERAPY | Age: 34
Setting detail: RECURRING SERIES
Discharge: HOME | End: 2024-05-06
Payer: MEDICAID

## 2024-05-03 PROCEDURE — 97110 THERAPEUTIC EXERCISES: CPT

## 2024-05-03 PROCEDURE — 97140 MANUAL THERAPY 1/> REGIONS: CPT

## 2024-05-03 ASSESSMENT — PAIN SCALES - GENERAL: PAINLEVEL_OUTOF10: 2

## 2024-05-03 NOTE — PROGRESS NOTES
feel  Treatment   THERAPEUTIC EXERCISE: (45 minutes):    Exercises per grid below to improve mobility and strength.  Required moderate visual, verbal, manual, and tactile cues to promote proper body alignment, promote proper body posture, promote proper body mechanics, and promote proper body breathing techniques.  Progressed resistance, range, repetitions, and complexity of movement as indicated.   Date:  4/26/2024  Date:  5/1/2024 Date:  5/3/2024   Activity/Exercise Parameters Parameters Parameters   Nu step  10 min, for HR increase to improve blood flow, kept sling on 15 min, for HR increase to improve blood flow, kept sling on   Wrist AROM Wrist extension x30  Wrist Flexion x30  Ulnar deviation x30  Radial Deviation x30 reviewed HEP   Scapular Retraction X30, seated Isometric hold: 5 sec, 10x, in side-lying Isometric hold: 5 sec, 10x, in side-lying, w/ arm at 0 deg and 60 deg supported   Upper Trap Stretch 30\" R 30 sec, 3x -   LS Stretch 30\" R     Wand ER  Discussed not to exceed 30 deg in supine Reviewed, discussed not to go past 30 deg   isometrics   Gentle IR and abd:    PROM   Passive range into ER ~30 deg, forward elevation ~90 deg and abduction ~90 deg  Gentle scapular upward/downward rotation    Education Treatment, home exercise plan, plan of care, prognosis, pain modulation, surgery precautions Reviewed HEP, discussed resting with her arm at 0 deg of rotation supported by pillows  Reviewed HEP   [employer] Access Code: [email or web address]     Time spent with patient reviewing proper muscle recruitment and technique with exercises.     MANUAL THERAPY: (- minutes):   Joint mobilization and Soft tissue mobilization was utilized and necessary because of the patient's restricted joint motion, painful spasm, loss of articular motion, and restricted motion of soft tissue.     MODALITIES: ( minutes):               HEP: As above; handouts given to patient for all exercises.     Treatment/Session Summary:    Treatment

## 2024-05-07 ENCOUNTER — HOSPITAL ENCOUNTER (OUTPATIENT)
Dept: PHYSICAL THERAPY | Age: 34
Setting detail: RECURRING SERIES
Discharge: HOME | End: 2024-05-10
Payer: MEDICAID

## 2024-05-07 PROCEDURE — 97110 THERAPEUTIC EXERCISES: CPT

## 2024-05-07 PROCEDURE — 97140 MANUAL THERAPY 1/> REGIONS: CPT

## 2024-05-07 ASSESSMENT — PAIN SCALES - GENERAL: PAINLEVEL_OUTOF10: 0

## 2024-05-07 NOTE — PROGRESS NOTES
Briana Nandini  : 1990  Primary: [employer] (Medicaid Managed)  Secondary:  [facility] [place]  [address]  [facility] [address]  Phone: [phone]  Fax: [phone] Plan Frequency: 2 sessions per week for 90 days (with the potential to reduce to 1 session per week pending patient presentation and availibility.)    Plan of Care/Certification Expiration Date: 24        Plan of Care/Certification Expiration Date:  Plan of Care/Certification Expiration Date: 24    Frequency/Duration:   Plan Frequency: 2 sessions per week for 90 days (with the potential to reduce to 1 session per week pending patient presentation and availibility.)      Time In/Out:   Time In: 1333  Time Out: 1425      PT Visit Info:         Visit Count:  4    OUTPATIENT PHYSICAL THERAPY:   Treatment Note 2024       Episode  (S/p R shoulder surgery)               Treatment Diagnosis:    Right shoulder pain, unspecified chronicity  Stiffness of right shoulder, not elsewhere classified  Medical/Referring Diagnosis:    Impingement syndrome of right shoulder [M75.41]  Other specified arthritis, right shoulder [M13.811]  Superior glenoid labrum lesion of right shoulder, initial encounter [S43.431A]  Bicipital tendinitis, right shoulder [M75.21]      Referring Physician:  Hoenig, Michael P, MD MD Orders:  PT Eval and Treat   Return MD Appt:  5/10/24 surgeon f/u   Date of Onset:  Onset Date: 24 (Date of surgery)     Allergies:   Patient has no known allergies.  Restrictions/Precautions:   Post Surgical Precautions: Biceps Tenodesis and Labral Repair      Interventions Planned (Treatment may consist of any combination of the following):     See Assessment Note    Subjective Comments:   Patient reports having R sided neck soreness. She says she is feeling better and no pain.   Initial Pain Level::     0/10  Post Session Pain Level:       1/10  Medications Last

## 2024-05-09 ENCOUNTER — HOSPITAL ENCOUNTER (OUTPATIENT)
Dept: PHYSICAL THERAPY | Age: 34
Setting detail: RECURRING SERIES
Discharge: HOME | End: 2024-05-12
Payer: MEDICAID

## 2024-05-09 PROCEDURE — 97110 THERAPEUTIC EXERCISES: CPT

## 2024-05-09 ASSESSMENT — PAIN SCALES - GENERAL: PAINLEVEL_OUTOF10: 2

## 2024-05-09 NOTE — PROGRESS NOTES
surgeon's office yet.   Initial Pain Level::     2/10  Post Session Pain Level:       2/10  Medications Last Reviewed:  5/9/2024  Updated Objective Findings:   Pt. Tolerated session well with PROM as per MD guidelines.   Treatment   THERAPEUTIC EXERCISE: (45 minutes):    Exercises per grid below to improve mobility and strength.  Required moderate visual, verbal, manual, and tactile cues to promote proper body alignment, promote proper body posture, promote proper body mechanics, and promote proper body breathing techniques.  Progressed resistance, range, repetitions, and complexity of movement as indicated.   Date:  5/9/2024  Date:  5/1/2024 Date:  5/3/2024   Activity/Exercise Parameters Parameters Parameters   Nu step None today 10 min, for HR increase to improve blood flow, kept sling on 15 min, for HR increase to improve blood flow, kept sling on   Wrist AROM HEP reviewed HEP   Scapular Retraction 30x3\", seated    Isometric hold: 5 sec, 10x, in side-lying, w/ arm at 0 deg and 60 deg supported Isometric hold: 5 sec, 10x, in side-lying Isometric hold: 5 sec, 10x, in side-lying, w/ arm at 0 deg and 60 deg supported   PROM to right shoulder as per MD guidelines  Right shoulder ER to 30 degrees, FE to 90 degrees, Abduction to 70 degrees                  Upper Trap Stretch 3x30 sec hold each  30 sec, 3x -   Levator scap  Stretch 3x30 sec hold  R     Wand ER Supine not past 30 degrees x 2 mins  Discussed not to exceed 30 deg in supine Reviewed, discussed not to go past 30 deg   isometrics Gentle IR and abduction with therapist assist. X 10 reps each   Gentle IR and abd:    Education Treatment, home exercise plan, plan of care, prognosis, pain modulation, surgery precautions Reviewed HEP, discussed resting with her arm at 0 deg of rotation supported by pillows  Reviewed HEP   [employer] Access Code: [email or web address]     Time spent with patient reviewing proper muscle recruitment and technique with exercises.     MANUAL THERAPY:

## 2024-05-13 ENCOUNTER — HOSPITAL ENCOUNTER (OUTPATIENT)
Dept: PHYSICAL THERAPY | Age: 34
Setting detail: RECURRING SERIES
Discharge: HOME | End: 2024-05-16
Payer: MEDICAID

## 2024-05-13 PROCEDURE — 97110 THERAPEUTIC EXERCISES: CPT

## 2024-05-13 PROCEDURE — 97140 MANUAL THERAPY 1/> REGIONS: CPT

## 2024-05-13 ASSESSMENT — PAIN SCALES - GENERAL: PAINLEVEL_OUTOF10: 1

## 2024-05-13 NOTE — PROGRESS NOTES
Briana Nandini  : 1990  Primary: [employer] (Medicaid Managed)  Secondary:  [facility] @ [place]  [address]  [facility] [address]  Phone: [phone]  Fax: [phone] Plan Frequency: 2 sessions per week for 90 days (with the potential to reduce to 1 session per week pending patient presentation and availibility.)    Plan of Care/Certification Expiration Date: 24        Plan of Care/Certification Expiration Date:  Plan of Care/Certification Expiration Date: 24    Frequency/Duration:   Plan Frequency: 2 sessions per week for 90 days (with the potential to reduce to 1 session per week pending patient presentation and availibility.)      Time In/Out:   Time In: 1057  Time Out: 1154      PT Visit Info:         Visit Count:  6    OUTPATIENT PHYSICAL THERAPY:   Treatment Note 2024       Episode  (S/p R shoulder surgery)               Treatment Diagnosis:    Right shoulder pain, unspecified chronicity  Stiffness of right shoulder, not elsewhere classified  Medical/Referring Diagnosis:    Impingement syndrome of right shoulder [M75.41]  Other specified arthritis, right shoulder [M13.811]  Superior glenoid labrum lesion of right shoulder, initial encounter [S43.431A]  Bicipital tendinitis, right shoulder [M75.21]      Referring Physician:  Hoenig, Michael P, MD MD Orders:  PT Eval and Treat   Return MD Appt:  5/10/24 surgeon f/u   Date of Onset:  Onset Date: 24 (Date of surgery)     Allergies:   Patient has no known allergies.  Restrictions/Precautions:   Post Surgical Precautions: Biceps Tenodesis and Labral Repair      Interventions Planned (Treatment may consist of any combination of the following):     See Assessment Note    Subjective Comments:   Patient reports feeling better but states there is a sharp discomfort in the R front of her shoulder  Initial Pain Level::     1/10  Post Session Pain Level:

## 2024-05-16 ENCOUNTER — HOSPITAL ENCOUNTER (OUTPATIENT)
Dept: PHYSICAL THERAPY | Age: 34
Setting detail: RECURRING SERIES
Discharge: HOME | End: 2024-05-19
Payer: MEDICAID

## 2024-05-16 PROCEDURE — 97110 THERAPEUTIC EXERCISES: CPT

## 2024-05-16 ASSESSMENT — PAIN SCALES - GENERAL: PAINLEVEL_OUTOF10: 4

## 2024-05-16 NOTE — PROGRESS NOTES
Briana Nandini  : 1990  Primary: [employer] (Medicaid Managed)  Secondary:  [facility] @ [place]  [address]  [facility] [address]  Phone: [phone]  Fax: [phone] Plan Frequency: 2 sessions per week for 90 days (with the potential to reduce to 1 session per week pending patient presentation and availibility.)    Plan of Care/Certification Expiration Date: 24        Plan of Care/Certification Expiration Date:  Plan of Care/Certification Expiration Date: 24    Frequency/Duration:   Plan Frequency: 2 sessions per week for 90 days (with the potential to reduce to 1 session per week pending patient presentation and availibility.)      Time In/Out:   Time In: 1331  Time Out: 1429      PT Visit Info:         Visit Count:  7    OUTPATIENT PHYSICAL THERAPY:   Treatment Note 2024       Episode  (S/p R shoulder surgery)               Treatment Diagnosis:    Right shoulder pain, unspecified chronicity  Stiffness of right shoulder, not elsewhere classified  Medical/Referring Diagnosis:    Impingement syndrome of right shoulder [M75.41]  Other specified arthritis, right shoulder [M13.811]  Superior glenoid labrum lesion of right shoulder, initial encounter [S43.431A]  Bicipital tendinitis, right shoulder [M75.21]      Referring Physician:  Hoenig, Michael P, MD MD Orders:  PT Eval and Treat   Return MD Appt:  5/10/24 surgeon f/u   Date of Onset:  Onset Date: 24 (Date of surgery)     Allergies:   Patient has no known allergies.  Restrictions/Precautions:   Post Surgical Precautions: Biceps Tenodesis and Labral Repair      Interventions Planned (Treatment may consist of any combination of the following):     See Assessment Note    Subjective Comments:   Patient reports her shoulder is sore and \"feels out of place\".   Initial Pain Level::     4/10  Post Session Pain Level:       4/10  Medications Last Reviewed:  2024  Updated

## 2024-05-23 ENCOUNTER — HOSPITAL ENCOUNTER (OUTPATIENT)
Dept: PHYSICAL THERAPY | Age: 34
Setting detail: RECURRING SERIES
Discharge: HOME | End: 2024-05-26
Payer: MEDICAID

## 2024-05-23 PROCEDURE — 97110 THERAPEUTIC EXERCISES: CPT

## 2024-05-23 ASSESSMENT — PAIN SCALES - GENERAL: PAINLEVEL_OUTOF10: 2

## 2024-05-23 NOTE — PROGRESS NOTES
Briana Nandini  : 1990  Primary: [employer] (Medicaid Managed)  Secondary:  [facility] @ [place]  [address]  [facility] [address]  Phone: [phone]  Fax: [phone] Plan Frequency: 2 sessions per week for 90 days (with the potential to reduce to 1 session per week pending patient presentation and availibility.)    Plan of Care/Certification Expiration Date: 24        Plan of Care/Certification Expiration Date:  Plan of Care/Certification Expiration Date: 24    Frequency/Duration:   Plan Frequency: 2 sessions per week for 90 days (with the potential to reduce to 1 session per week pending patient presentation and availibility.)      Time In/Out:   Time In: 1433  Time Out: 1550      PT Visit Info:         Visit Count:  8    OUTPATIENT PHYSICAL THERAPY:   Treatment Note 2024       Episode  (S/p R shoulder surgery)               Treatment Diagnosis:    Right shoulder pain, unspecified chronicity  Stiffness of right shoulder, not elsewhere classified  Medical/Referring Diagnosis:    Impingement syndrome of right shoulder [M75.41]  Other specified arthritis, right shoulder [M13.811]  Superior glenoid labrum lesion of right shoulder, initial encounter [S43.431A]  Bicipital tendinitis, right shoulder [M75.21]      Referring Physician:  Hoenig, Michael P, MD MD Orders:  PT Eval and Treat   Return MD Appt:  5/10/24 surgeon f/u   Date of Onset:  Onset Date: 24 (Date of surgery)     Allergies:   Patient has no known allergies.  Restrictions/Precautions:   Post Surgical Precautions: Biceps Tenodesis and Labral Repair      Interventions Planned (Treatment may consist of any combination of the following):     See Assessment Note    Subjective Comments:   Patient reports her shoulder feels stiff and is sore.   Initial Pain Level::     2/10  Post Session Pain Level:       2/10  Medications Last Reviewed:  2024  Updated Objective

## 2024-05-24 ENCOUNTER — HOSPITAL ENCOUNTER (OUTPATIENT)
Dept: PHYSICAL THERAPY | Age: 34
Setting detail: RECURRING SERIES
Discharge: HOME | End: 2024-05-27
Payer: MEDICAID

## 2024-05-24 PROCEDURE — 97110 THERAPEUTIC EXERCISES: CPT

## 2024-05-24 ASSESSMENT — PAIN SCALES - GENERAL: PAINLEVEL_OUTOF10: 3

## 2024-05-24 NOTE — PROGRESS NOTES
Briana Nandini  : 1990  Primary: [employer] (Medicaid Managed)  Secondary:  [facility] [place]  [address]  [facility] [address]  Phone: [phone]  Fax: [phone] Plan Frequency: 2 sessions per week for 90 days (with the potential to reduce to 1 session per week pending patient presentation and availibility.)    Plan of Care/Certification Expiration Date: 24        Plan of Care/Certification Expiration Date:  Plan of Care/Certification Expiration Date: 24    Frequency/Duration:   Plan Frequency: 2 sessions per week for 90 days (with the potential to reduce to 1 session per week pending patient presentation and availibility.)      Time In/Out:   Time In: 1430  Time Out: 1528      PT Visit Info:         Visit Count:  9    OUTPATIENT PHYSICAL THERAPY:   Treatment Note 2024       Episode  (S/p R shoulder surgery)               Treatment Diagnosis:    Right shoulder pain, unspecified chronicity  Stiffness of right shoulder, not elsewhere classified  Medical/Referring Diagnosis:    Impingement syndrome of right shoulder [M75.41]  Other specified arthritis, right shoulder [M13.811]  Superior glenoid labrum lesion of right shoulder, initial encounter [S43.431A]  Bicipital tendinitis, right shoulder [M75.21]      Referring Physician:  Hoenig, Michael P, MD MD Orders:  PT Eval and Treat   Return MD Appt:  5/10/24 surgeon f/u   Date of Onset:  Onset Date: 24 (Date of surgery)     Allergies:   Patient has no known allergies.  Restrictions/Precautions:   Post Surgical Precautions: Biceps Tenodesis and Labral Repair      Interventions Planned (Treatment may consist of any combination of the following):     See Assessment Note    Subjective Comments:   Patient reports her shoulder feels sore from yesterday's session.   Initial Pain Level::     3/10  Post Session Pain Level:       3/10  Medications Last Reviewed:

## 2024-05-29 ENCOUNTER — HOSPITAL ENCOUNTER (OUTPATIENT)
Dept: PHYSICAL THERAPY | Age: 34
Setting detail: RECURRING SERIES
Discharge: HOME | End: 2024-06-01
Payer: MEDICAID

## 2024-05-29 PROCEDURE — 97110 THERAPEUTIC EXERCISES: CPT

## 2024-05-29 ASSESSMENT — PAIN SCALES - GENERAL: PAINLEVEL_OUTOF10: 0

## 2024-05-29 NOTE — PROGRESS NOTES
Brianajess Delatorre  : 1990  Primary: [employer] (Medicaid Managed)  Secondary:  [facility] [place]  [address]  [facility] [address]  Phone: [phone]  Fax: [phone] Plan Frequency: 2 sessions per week for 90 days (with the potential to reduce to 1 session per week pending patient presentation and availibility.)    Plan of Care/Certification Expiration Date: 24        Plan of Care/Certification Expiration Date:  Plan of Care/Certification Expiration Date: 24    Frequency/Duration: Plan Frequency: 2 sessions per week for 90 days (with the potential to reduce to 1 session per week pending patient presentation and availibility.)      Time In/Out:          PT Visit Info:         Visit Count:  10                OUTPATIENT PHYSICAL THERAPY:             Progress Report 2024               Episode (S/p R shoulder surgery)         Treatment Diagnosis:     No data found  Medical/Referring Diagnosis:    Impingement syndrome of right shoulder [M75.41]  Other specified arthritis, right shoulder [M13.811]  Superior glenoid labrum lesion of right shoulder, initial encounter [S43.431A]  Bicipital tendinitis, right shoulder [M75.21]      Referring Physician:  Hoenig, Michael P, MD MD Orders:  PT Eval and Treat   Return MD Appt:  6/10/24 follow-up  Date of Onset:  Onset Date: 24 (Date of surgery)     Allergies:  Patient has no known allergies.  Restrictions/Precautions:    Post Surgical Precautions: Biceps Tenodesis and posterior labrum       Medications Last Reviewed:  2024          OBJECTIVE       ROM Date:  2024    RIGHT LEFT   UEROM   Flexion 115°, painful stop WNL   Extension -- WNL   Abd 105 WNL   ER  35 WNL   IR   45 WNL   Elbow flexion 140 WNL   Elbow extension 0 WNL           SHOULDER STRENGTH: Date:  2024    RIGHT LEFT   Shoulder Flexion LUE NOT TESTED TODAY 5   Shoulder Extension - 5   Shoulder Abduction

## 2024-05-29 NOTE — PROGRESS NOTES
Briana Nandini  : 1990  Primary: [employer] (Medicaid Managed)  Secondary:  [facility] [place]  [address]  [facility] [address]  Phone: [phone]  Fax: [phone] Plan Frequency: 2 sessions per week for 90 days (with the potential to reduce to 1 session per week pending patient presentation and availibility.)    Plan of Care/Certification Expiration Date: 24        Plan of Care/Certification Expiration Date:  Plan of Care/Certification Expiration Date: 24    Frequency/Duration:   Plan Frequency: 2 sessions per week for 90 days (with the potential to reduce to 1 session per week pending patient presentation and availibility.)      Time In/Out:   Time In: 1330  Time Out: 1430      PT Visit Info:         Visit Count:  10    OUTPATIENT PHYSICAL THERAPY:   Treatment Note 2024       Episode  (S/p R shoulder surgery)               Treatment Diagnosis:    Right shoulder pain, unspecified chronicity  Stiffness of right shoulder, not elsewhere classified  Medical/Referring Diagnosis:    Impingement syndrome of right shoulder [M75.41]  Other specified arthritis, right shoulder [M13.811]  Superior glenoid labrum lesion of right shoulder, initial encounter [S43.431A]  Bicipital tendinitis, right shoulder [M75.21]      Referring Physician:  Hoenig, Michael P, MD MD Orders:  PT Eval and Treat   Return MD Appt:  5/10/24 surgeon f/u   Date of Onset:  Onset Date: 24 (Date of surgery)     Allergies:   Patient has no known allergies.  Restrictions/Precautions:   Post Surgical Precautions: Biceps Tenodesis and Labral Repair      Interventions Planned (Treatment may consist of any combination of the following):     See Assessment Note    Subjective Comments:   Patient arrives bracing her arm but w/o sling. She denies pain.   Initial Pain Level::     0/10  Post Session Pain Level:       2/10  Medications Last Reviewed:

## 2024-05-31 ENCOUNTER — HOSPITAL ENCOUNTER (OUTPATIENT)
Dept: PHYSICAL THERAPY | Age: 34
Setting detail: RECURRING SERIES
End: 2024-05-31
Payer: MEDICAID

## 2024-05-31 PROCEDURE — 97110 THERAPEUTIC EXERCISES: CPT

## 2024-05-31 ASSESSMENT — PAIN SCALES - GENERAL: PAINLEVEL_OUTOF10: 1

## 2024-05-31 NOTE — PROGRESS NOTES
Briana Nandini  : 1990  Primary: [employer] (Medicaid Managed)  Secondary:  [facility] [place]  [address]  [facility] [address]  Phone: [phone]  Fax: [phone] Plan Frequency: 2 sessions per week for 90 days (with the potential to reduce to 1 session per week pending patient presentation and availibility.)    Plan of Care/Certification Expiration Date: 24        Plan of Care/Certification Expiration Date:  Plan of Care/Certification Expiration Date: 24    Frequency/Duration:   Plan Frequency: 2 sessions per week for 90 days (with the potential to reduce to 1 session per week pending patient presentation and availibility.)      Time In/Out:   Time In: 1330  Time Out: 1427      PT Visit Info:         Visit Count:  11    OUTPATIENT PHYSICAL THERAPY:   Treatment Note 2024       Episode  (S/p R shoulder surgery)               Treatment Diagnosis:    Right shoulder pain, unspecified chronicity  Stiffness of right shoulder, not elsewhere classified  Medical/Referring Diagnosis:    Impingement syndrome of right shoulder [M75.41]  Other specified arthritis, right shoulder [M13.811]  Superior glenoid labrum lesion of right shoulder, initial encounter [S43.431A]  Bicipital tendinitis, right shoulder [M75.21]      Referring Physician:  Hoenig, Michael P, MD MD Orders:  PT Eval and Treat   Return MD Appt:  5/10/24 surgeon f/u   Date of Onset:  Onset Date: 24 (Date of surgery)     Allergies:   Patient has no known allergies.  Restrictions/Precautions:   Post Surgical Precautions: Biceps Tenodesis and Labral Repair      Interventions Planned (Treatment may consist of any combination of the following):     See Assessment Note    Subjective Comments:   Patient arrives to therapy with minimal pain. She states she has been compliant with her HEP.   Initial Pain Level::     1/10  Post Session Pain Level:       2/10  Medications  Last Reviewed:  5/31/2024  Updated Objective Findings:   Abduction: 120°  Treatment   THERAPEUTIC EXERCISE: (55 minutes):    Exercises per grid below to improve mobility and strength.  Required moderate visual, verbal, manual, and tactile cues to promote proper body alignment, promote proper body posture, promote proper body mechanics, and promote proper body breathing techniques.  Progressed resistance, range, repetitions, and complexity of movement as indicated.   Date:  5/31/2024   4 wks p/o Date:  5/24/2024 Date:  5/29/2024   Activity/Exercise Parameters Parameters Parameters   Nu step -- 10 min, for HR increase to improve blood flow, kept sling on --   Shoulder AAROM Cane Flexion: 2x15, supine Cane Flexion: 2x15, supine  Cane ER/IR: 2x15, supine  Pulleys: Flexion and Scaption, 2x15 each Cane Flexion: 2x15, supine   Shoulder AROM Abduction: Side lying, to 90°, 3x10  Side lying, to 90°, 3x10   Shoulder Walkouts X15 from high plinth X15 from high plinth X15 from high plinth   Rhythmic Stabilization  3x30\" arm at 90° pointing at target  3x30\" arm at 90° pointing at target   Rows Standing, red, 2x15  Prone Rows, 3x10, 0#   Scapular Retraction  30x3\", seated Reviewed   PROM 15 minutes: R Shoulder and Elbow PROM in all planes within protocol restrictions; supine 15 minutes: R Shoulder PROM in all planes within protocol restrictions; supine 15 minutes: R Shoulder and Elbow PROM in all planes within protocol restrictions; supine   Upper Trap Stretch Seated, 2 minutes  Reviewed   Levator scap  Stretch   Reviewed   Pendulums  X20 each, CW and CCW X20 each, CW and CCW   Isometrics ER: Walkouts, 2x10, yellow 15x3\" gentle into pillow Walkouts, 2x10, yellow   Education      [employer] Access Code: [email or web address]     Time spent with patient reviewing proper muscle recruitment and technique with exercises.     MANUAL THERAPY: (00 minutes): NOT BILLED DUE TO INSURANCE  Joint mobilization and Soft tissue mobilization was utilized and

## 2024-06-10 ENCOUNTER — APPOINTMENT (OUTPATIENT)
Dept: PHYSICAL THERAPY | Age: 34
End: 2024-06-10

## 2024-06-10 ENCOUNTER — HOSPITAL ENCOUNTER (OUTPATIENT)
Dept: PHYSICAL THERAPY | Age: 34
Setting detail: RECURRING SERIES
Discharge: HOME | End: 2024-06-13

## 2024-06-10 PROCEDURE — 97110 THERAPEUTIC EXERCISES: CPT

## 2024-06-10 ASSESSMENT — PAIN SCALES - GENERAL: PAINLEVEL_OUTOF10: 0

## 2024-06-10 NOTE — PROGRESS NOTES
Briana Nandini  : 1990  Primary:  (Medicaid Managed)  Secondary:  [facility] [place]  [address]  [address]  [facility] [address]  Phone: [phone]  Fax: [phone] Plan Frequency: 2 sessions per week for 90 days (with the potential to reduce to 1 session per week pending patient presentation and availibility.)    Plan of Care/Certification Expiration Date: 24        Plan of Care/Certification Expiration Date:  Plan of Care/Certification Expiration Date: 24    Frequency/Duration:   Plan Frequency: 2 sessions per week for 90 days (with the potential to reduce to 1 session per week pending patient presentation and availibility.)      Time In/Out:   Time In: 1330  Time Out: 1430      PT Visit Info:         Visit Count:  12    OUTPATIENT PHYSICAL THERAPY:   Treatment Note 6/10/2024       Episode  (S/p R shoulder surgery)               Treatment Diagnosis:    Right shoulder pain, unspecified chronicity  Stiffness of right shoulder, not elsewhere classified  Medical/Referring Diagnosis:    Impingement syndrome of right shoulder [M75.41]  Other specified arthritis, right shoulder [M13.811]  Superior glenoid labrum lesion of right shoulder, initial encounter [S43.431A]  Bicipital tendinitis, right shoulder [M75.21]      Referring Physician:  Hoenig, Michael P, MD MD Orders:  PT Eval and Treat   Return MD Appt:  5/10/24 surgeon f/u   Date of Onset:  Onset Date: 24 (Date of surgery)     Allergies:   Patient has no known allergies.  Restrictions/Precautions:   Post Surgical Precautions: Biceps Tenodesis and Labral Repair      Interventions Planned (Treatment may consist of any combination of the following):     See Assessment Note    Subjective Comments:   Patient states her arm is moving a bit more. She denies pain at arrival.   Initial Pain Level::     0/10  Post Session Pain Level:       0/10  Medications Last Reviewed:  6/10/2024  Updated Objective

## 2024-06-17 ENCOUNTER — APPOINTMENT (OUTPATIENT)
Dept: PHYSICAL THERAPY | Age: 34
End: 2024-06-17

## 2024-06-19 ENCOUNTER — HOSPITAL ENCOUNTER (OUTPATIENT)
Dept: PHYSICAL THERAPY | Age: 34
Setting detail: RECURRING SERIES
End: 2024-06-19

## 2024-06-21 ENCOUNTER — HOSPITAL ENCOUNTER (OUTPATIENT)
Dept: PHYSICAL THERAPY | Age: 34
Setting detail: RECURRING SERIES
Discharge: HOME | End: 2024-06-24

## 2024-06-21 PROCEDURE — 97110 THERAPEUTIC EXERCISES: CPT

## 2024-06-21 NOTE — PROGRESS NOTES
Briana Nandini  : 1990  Primary:  (Medicaid Managed)  Secondary:  [facility] [place]  [address]  [facility] [address]  Phone: [phone]  Fax: [phone] Plan Frequency: 2 sessions per week for 90 days (with the potential to reduce to 1 session per week pending patient presentation and availibility.)    Plan of Care/Certification Expiration Date: 24        Plan of Care/Certification Expiration Date:  Plan of Care/Certification Expiration Date: 24    Frequency/Duration:   Plan Frequency: 2 sessions per week for 90 days (with the potential to reduce to 1 session per week pending patient presentation and availibility.)      Time In/Out:   Time In: 0800  Time Out: 0900      PT Visit Info:         Visit Count:  13    OUTPATIENT PHYSICAL THERAPY:   Treatment Note 2024       Episode  (S/p R shoulder surgery)               Treatment Diagnosis:    Right shoulder pain, unspecified chronicity  Stiffness of right shoulder, not elsewhere classified  Medical/Referring Diagnosis:    Impingement syndrome of right shoulder [M75.41]  Other specified arthritis, right shoulder [M13.811]  Superior glenoid labrum lesion of right shoulder, initial encounter [S43.431A]  Bicipital tendinitis, right shoulder [M75.21]      Referring Physician:  Hoenig, Michael P, MD MD Orders:  PT Eval and Treat   Return MD Appt:  5/10/24 surgeon f/u   Date of Onset:  Onset Date: 24 (Date of surgery)     Allergies:   Patient has no known allergies.  Restrictions/Precautions:   Post Surgical Precautions: Biceps Tenodesis and Labral Repair      Interventions Planned (Treatment may consist of any combination of the following):     See Assessment Note    Subjective Comments:   Patient states she feels better and is sleeping better.   Initial Pain Level::     0/10  Post Session Pain Level:       0/10  Medications Last Reviewed:  2024  Updated Objective Findings:   See

## 2024-06-24 ASSESSMENT — PAIN SCALES - GENERAL: PAINLEVEL_OUTOF10: 0

## 2024-06-25 ENCOUNTER — APPOINTMENT (OUTPATIENT)
Dept: PHYSICAL THERAPY | Age: 34
End: 2024-06-25

## 2024-10-02 NOTE — PROGRESS NOTES
Patient presents today for a routine gynecological examination with no complaints.    OB History          1    Para   1    Term   1       0    AB   0    Living   1         SAB        IAB        Ectopic        Molar        Multiple        Live Births   1              Last pap: 22 - Nilm, HPV neg   Last mammo: N/A  Last colonoscopy: N/A  Last DEXA: N/A      GYN History     LMP: 24 Cycle Length 28 Lasting 4  negative dysmenorrhea; negative postcoital bleeding    Sexually active using OCps for birth control   STD screening declined     Past Medical History:  Past Medical History:   Diagnosis Date    Brain tumor, astrocytoma (HCC)     had dubulking surgery and chemo, followed by oncology [facility]    Chlamydia      tx'd    Chronic tension-type headache, not intractable 2019    IBS (irritable bowel syndrome)        Past Surgical History:  Past Surgical History:   Procedure Laterality Date    OTHER SURGICAL HISTORY      brain tumor debulking surgery 3/2019       Allergies:   No Known Allergies    Medication History:  Current Outpatient Medications   Medication Sig Dispense Refill    omeprazole (PRILOSEC) 20 MG delayed release capsule Take 2 capsules by mouth daily      Rosuvastatin Calcium 10 MG CPSP Take by mouth      HYDROcodone-acetaminophen (NORCO) 7.5-325 MG per tablet Take 1 tablet by mouth every 6 hours as needed for Pain. Max Daily Amount: 4 tablets      drospirenone-ethinyl estradiol 3-0.03 MG TABS Take 1 tablet by mouth daily 3 packet 4    ibuprofen (ADVIL;MOTRIN) 800 MG tablet Take 1 tablet by mouth every 6 hours as needed for Pain      Cholecalciferol (VITAMIN D3) 125 MCG (5000 UT) TABS Take by mouth      Multiple Vitamins-Minerals (WOMENS ONE DAILY PO) Take by mouth      Biotin 2.5 MG CAPS Take by mouth      naproxen (NAPROSYN) 500 MG tablet Take 1 tablet by mouth 2 times daily as needed for Pain (Patient not taking: Reported on 10/3/2024) 180 tablet 1    docusate sodium (COLACE)

## 2024-10-03 ENCOUNTER — OFFICE VISIT (OUTPATIENT)
Dept: OBGYN CLINIC | Age: 34
End: 2024-10-03
Payer: MEDICAID

## 2024-10-03 VITALS — WEIGHT: 193.3 LBS | BODY MASS INDEX: 30.28 KG/M2 | DIASTOLIC BLOOD PRESSURE: 86 MMHG | SYSTOLIC BLOOD PRESSURE: 124 MMHG

## 2024-10-03 DIAGNOSIS — Z13.89 SCREENING FOR GENITOURINARY CONDITION: ICD-10-CM

## 2024-10-03 DIAGNOSIS — Z01.419 WELL WOMAN EXAM: Primary | ICD-10-CM

## 2024-10-03 DIAGNOSIS — Z30.41 SURVEILLANCE FOR BIRTH CONTROL, ORAL CONTRACEPTIVES: ICD-10-CM

## 2024-10-03 LAB
BILIRUBIN, URINE, POC: NEGATIVE
BLOOD URINE, POC: NEGATIVE
GLUCOSE URINE, POC: NEGATIVE
KETONES, URINE, POC: NEGATIVE
LEUKOCYTE ESTERASE, URINE, POC: NEGATIVE
NITRITE, URINE, POC: NEGATIVE
PH, URINE, POC: 7 (ref 4.6–8)
PROTEIN,URINE, POC: NEGATIVE
SPECIFIC GRAVITY, URINE, POC: 1.01 (ref 1–1.03)
URINALYSIS CLARITY, POC: CLEAR
URINALYSIS COLOR, POC: YELLOW
UROBILINOGEN, POC: NORMAL MG/DL

## 2024-10-03 PROCEDURE — 81002 URINALYSIS NONAUTO W/O SCOPE: CPT | Performed by: NURSE PRACTITIONER

## 2024-10-03 PROCEDURE — 99395 PREV VISIT EST AGE 18-39: CPT | Performed by: NURSE PRACTITIONER

## 2024-10-03 RX ORDER — HYDROCODONE BITARTRATE AND ACETAMINOPHEN 7.5; 325 MG/1; MG/1
1 TABLET ORAL EVERY 6 HOURS PRN
COMMUNITY

## 2024-10-03 RX ORDER — DROSPIRENONE AND ETHINYL ESTRADIOL 0.03MG-3MG
1 KIT ORAL DAILY
Qty: 3 PACKET | Refills: 4 | Status: SHIPPED | OUTPATIENT
Start: 2024-10-03

## (undated) DEVICE — PENCIL ES L3M BTTN SWCH S STL HEX LOK BLDE ELECTRD HOLSTER

## (undated) DEVICE — SUTURE PLN GUT SZ 2-0 L27IN ABSRB YELLOWISH TAN L40MM CT 853H

## (undated) DEVICE — Device: Brand: PORTEX

## (undated) DEVICE — SOLUTION IRRIG 1000ML H2O STRL BLT

## (undated) DEVICE — SUTURE MCRYL SZ 4-0 L27IN ABSRB UD L19MM PS-2 1/2 CIR PRIM Y426H

## (undated) DEVICE — SOLUTION IV 1000ML 0.9% SOD CHL

## (undated) DEVICE — AMD ANTIMICROBIAL NON-ADHERENT PAD,0.2% POLYHEXAMETHYLENE BIGUANIDE HCI (PHMB): Brand: TELFA

## (undated) DEVICE — MEDI-VAC NON-CONDUCTIVE SUCTION TUBING: Brand: CARDINAL HEALTH

## (undated) DEVICE — SUT CHRMC 0 27IN CT1 BRN --

## (undated) DEVICE — REM POLYHESIVE ADULT PATIENT RETURN ELECTRODE: Brand: VALLEYLAB

## (undated) DEVICE — PREP SKN CHLRAPRP 26ML TNT -- CONVERT TO ITEM 373320

## (undated) DEVICE — AMD ANTIMICROBIAL GAUZE SPONGES,12 PLY USP TYPE VII, 0.2% POLYHEXAMETHYLENE BIGUANIDE HCI (PHMB): Brand: CURITY

## (undated) DEVICE — STERILE POLYISOPRENE POWDER-FREE SURGICAL GLOVES: Brand: PROTEXIS

## (undated) DEVICE — SURGICAL PROCEDURE PACK C SECT CDS

## (undated) DEVICE — CATH FOL TY IC BAG 16FR 2000ML -- CONVERT TO ITEM 363158

## (undated) DEVICE — SUT CHRMC 1 36IN CTX BRN --

## (undated) DEVICE — KENDALL SCD EXPRESS SLEEVES, KNEE LENGTH, MEDIUM: Brand: KENDALL SCD